# Patient Record
Sex: MALE | Race: WHITE | NOT HISPANIC OR LATINO | Employment: UNEMPLOYED | URBAN - NONMETROPOLITAN AREA
[De-identification: names, ages, dates, MRNs, and addresses within clinical notes are randomized per-mention and may not be internally consistent; named-entity substitution may affect disease eponyms.]

---

## 2017-02-26 ENCOUNTER — HOSPITAL ENCOUNTER (EMERGENCY)
Facility: HOSPITAL | Age: 62
Discharge: HOME | End: 2017-02-26
Admitting: EMERGENCY MEDICINE

## 2017-02-26 ENCOUNTER — APPOINTMENT (OUTPATIENT)
Dept: CT IMAGING | Facility: HOSPITAL | Age: 62
End: 2017-02-26

## 2017-02-26 VITALS
SYSTOLIC BLOOD PRESSURE: 130 MMHG | OXYGEN SATURATION: 100 % | BODY MASS INDEX: 26.67 KG/M2 | RESPIRATION RATE: 18 BRPM | HEART RATE: 55 BPM | TEMPERATURE: 98 F | HEIGHT: 68 IN | DIASTOLIC BLOOD PRESSURE: 74 MMHG | WEIGHT: 176 LBS

## 2017-02-26 DIAGNOSIS — R31.9 BLOOD IN URINE: ICD-10-CM

## 2017-02-26 DIAGNOSIS — R10.9 ABDOMINAL PAIN, UNSPECIFIED LOCATION: ICD-10-CM

## 2017-02-26 DIAGNOSIS — K52.9 GASTROENTERITIS: Primary | ICD-10-CM

## 2017-02-26 LAB
ABO GROUP BLD: NORMAL
ALBUMIN SERPL-MCNC: 4.3 G/DL (ref 3.5–5)
ALBUMIN/GLOB SERPL: 1.3 G/DL (ref 1.1–2.5)
ALP SERPL-CCNC: 65 U/L (ref 24–120)
ALT SERPL W P-5'-P-CCNC: 30 U/L (ref 0–54)
AMYLASE SERPL-CCNC: 59 U/L (ref 30–110)
ANION GAP SERPL CALCULATED.3IONS-SCNC: 10 MMOL/L (ref 4–13)
APTT PPP: 28.8 SECONDS (ref 24.1–34.8)
AST SERPL-CCNC: 28 U/L (ref 7–45)
BACTERIA UR QL AUTO: ABNORMAL /HPF
BASOPHILS # BLD AUTO: 0.01 10*3/MM3 (ref 0–0.2)
BASOPHILS NFR BLD AUTO: 0.1 % (ref 0–2)
BILIRUB SERPL-MCNC: 0.8 MG/DL (ref 0.1–1)
BILIRUB UR QL STRIP: NEGATIVE
BLD GP AB SCN SERPL QL: NEGATIVE
BUN BLD-MCNC: 13 MG/DL (ref 5–21)
BUN/CREAT SERPL: 15.3 (ref 7–25)
CALCIUM SPEC-SCNC: 9.5 MG/DL (ref 8.4–10.4)
CHLORIDE SERPL-SCNC: 101 MMOL/L (ref 98–110)
CLARITY UR: CLEAR
CO2 SERPL-SCNC: 26 MMOL/L (ref 24–31)
COLOR UR: YELLOW
CREAT BLD-MCNC: 0.85 MG/DL (ref 0.5–1.4)
CRP SERPL-MCNC: 1.55 MG/DL (ref 0–0.99)
D-LACTATE SERPL-SCNC: 1.6 MMOL/L (ref 0.5–2)
DEPRECATED RDW RBC AUTO: 36.4 FL (ref 40–54)
EOSINOPHIL # BLD AUTO: 0 10*3/MM3 (ref 0–0.7)
EOSINOPHIL NFR BLD AUTO: 0 % (ref 0–4)
ERYTHROCYTE [DISTWIDTH] IN BLOOD BY AUTOMATED COUNT: 12.3 % (ref 12–15)
GFR SERPL CREATININE-BSD FRML MDRD: 92 ML/MIN/1.73
GLOBULIN UR ELPH-MCNC: 3.4 GM/DL
GLUCOSE BLD-MCNC: 126 MG/DL (ref 70–100)
GLUCOSE UR STRIP-MCNC: NEGATIVE MG/DL
HCT VFR BLD AUTO: 41.6 % (ref 40–52)
HGB BLD-MCNC: 14.7 G/DL (ref 14–18)
HGB UR QL STRIP.AUTO: ABNORMAL
HYALINE CASTS UR QL AUTO: ABNORMAL /LPF
IMM GRANULOCYTES # BLD: 0 10*3/MM3 (ref 0–0.03)
IMM GRANULOCYTES NFR BLD: 0 % (ref 0–5)
INR PPP: 0.98 (ref 0.91–1.09)
KETONES UR QL STRIP: ABNORMAL
LEUKOCYTE ESTERASE UR QL STRIP.AUTO: NEGATIVE
LIPASE SERPL-CCNC: 62 U/L (ref 23–203)
LYMPHOCYTES # BLD AUTO: 0.56 10*3/MM3 (ref 0.72–4.86)
LYMPHOCYTES NFR BLD AUTO: 8.2 % (ref 15–45)
MCH RBC QN AUTO: 28.7 PG (ref 28–32)
MCHC RBC AUTO-ENTMCNC: 35.3 G/DL (ref 33–36)
MCV RBC AUTO: 81.3 FL (ref 82–95)
MONOCYTES # BLD AUTO: 0.15 10*3/MM3 (ref 0.19–1.3)
MONOCYTES NFR BLD AUTO: 2.2 % (ref 4–12)
NEUTROPHILS # BLD AUTO: 6.14 10*3/MM3 (ref 1.87–8.4)
NEUTROPHILS NFR BLD AUTO: 89.5 % (ref 39–78)
NITRITE UR QL STRIP: NEGATIVE
PH UR STRIP.AUTO: 6 [PH] (ref 5–8)
PLATELET # BLD AUTO: 210 10*3/MM3 (ref 130–400)
PMV BLD AUTO: 10.7 FL (ref 6–12)
POTASSIUM BLD-SCNC: 3.9 MMOL/L (ref 3.5–5.3)
PROT SERPL-MCNC: 7.7 G/DL (ref 6.3–8.7)
PROT UR QL STRIP: NEGATIVE
PROTHROMBIN TIME: 13.3 SECONDS (ref 11.9–14.6)
RBC # BLD AUTO: 5.12 10*6/MM3 (ref 4.8–5.9)
RBC # UR: ABNORMAL /HPF
REF LAB TEST METHOD: ABNORMAL
RH BLD: NEGATIVE
SODIUM BLD-SCNC: 137 MMOL/L (ref 135–145)
SP GR UR STRIP: 1.01 (ref 1–1.03)
SQUAMOUS #/AREA URNS HPF: ABNORMAL /HPF
TROPONIN I SERPL-MCNC: 0 NG/ML (ref 0–0.07)
TROPONIN I SERPL-MCNC: 0 NG/ML (ref 0–0.07)
UROBILINOGEN UR QL STRIP: ABNORMAL
WBC NRBC COR # BLD: 6.86 10*3/MM3 (ref 4.8–10.8)
WBC UR QL AUTO: ABNORMAL /HPF

## 2017-02-26 PROCEDURE — 83690 ASSAY OF LIPASE: CPT | Performed by: NURSE PRACTITIONER

## 2017-02-26 PROCEDURE — 86850 RBC ANTIBODY SCREEN: CPT

## 2017-02-26 PROCEDURE — 25010000002 ONDANSETRON PER 1 MG: Performed by: NURSE PRACTITIONER

## 2017-02-26 PROCEDURE — 85025 COMPLETE CBC W/AUTO DIFF WBC: CPT | Performed by: NURSE PRACTITIONER

## 2017-02-26 PROCEDURE — 86900 BLOOD TYPING SEROLOGIC ABO: CPT

## 2017-02-26 PROCEDURE — 86140 C-REACTIVE PROTEIN: CPT | Performed by: NURSE PRACTITIONER

## 2017-02-26 PROCEDURE — 80053 COMPREHEN METABOLIC PANEL: CPT | Performed by: NURSE PRACTITIONER

## 2017-02-26 PROCEDURE — 85730 THROMBOPLASTIN TIME PARTIAL: CPT | Performed by: NURSE PRACTITIONER

## 2017-02-26 PROCEDURE — 83605 ASSAY OF LACTIC ACID: CPT | Performed by: NURSE PRACTITIONER

## 2017-02-26 PROCEDURE — 0 IOPAMIDOL 61 % SOLUTION: Performed by: NURSE PRACTITIONER

## 2017-02-26 PROCEDURE — 93005 ELECTROCARDIOGRAM TRACING: CPT

## 2017-02-26 PROCEDURE — 99284 EMERGENCY DEPT VISIT MOD MDM: CPT

## 2017-02-26 PROCEDURE — 82150 ASSAY OF AMYLASE: CPT | Performed by: NURSE PRACTITIONER

## 2017-02-26 PROCEDURE — 96375 TX/PRO/DX INJ NEW DRUG ADDON: CPT

## 2017-02-26 PROCEDURE — 93005 ELECTROCARDIOGRAM TRACING: CPT | Performed by: NURSE PRACTITIONER

## 2017-02-26 PROCEDURE — 96374 THER/PROPH/DIAG INJ IV PUSH: CPT

## 2017-02-26 PROCEDURE — 84484 ASSAY OF TROPONIN QUANT: CPT

## 2017-02-26 PROCEDURE — 74177 CT ABD & PELVIS W/CONTRAST: CPT

## 2017-02-26 PROCEDURE — 25010000002 HYDROMORPHONE PER 4 MG: Performed by: NURSE PRACTITIONER

## 2017-02-26 PROCEDURE — 85610 PROTHROMBIN TIME: CPT | Performed by: NURSE PRACTITIONER

## 2017-02-26 PROCEDURE — 81001 URINALYSIS AUTO W/SCOPE: CPT | Performed by: NURSE PRACTITIONER

## 2017-02-26 PROCEDURE — 86901 BLOOD TYPING SEROLOGIC RH(D): CPT

## 2017-02-26 PROCEDURE — 93010 ELECTROCARDIOGRAM REPORT: CPT | Performed by: INTERNAL MEDICINE

## 2017-02-26 PROCEDURE — 96361 HYDRATE IV INFUSION ADD-ON: CPT

## 2017-02-26 RX ORDER — ONDANSETRON 4 MG/1
4 TABLET, ORALLY DISINTEGRATING ORAL EVERY 6 HOURS PRN
Qty: 12 TABLET | Refills: 0 | Status: SHIPPED | OUTPATIENT
Start: 2017-02-26 | End: 2017-03-01

## 2017-02-26 RX ORDER — LISINOPRIL 5 MG/1
5 TABLET ORAL DAILY
COMMUNITY
End: 2018-02-23

## 2017-02-26 RX ORDER — ONDANSETRON 2 MG/ML
4 INJECTION INTRAMUSCULAR; INTRAVENOUS ONCE
Status: COMPLETED | OUTPATIENT
Start: 2017-02-26 | End: 2017-02-26

## 2017-02-26 RX ORDER — SODIUM CHLORIDE 0.9 % (FLUSH) 0.9 %
10 SYRINGE (ML) INJECTION AS NEEDED
Status: DISCONTINUED | OUTPATIENT
Start: 2017-02-26 | End: 2017-02-26 | Stop reason: HOSPADM

## 2017-02-26 RX ORDER — FAMOTIDINE 40 MG/1
40 TABLET, FILM COATED ORAL DAILY
Qty: 5 TABLET | Refills: 0 | Status: SHIPPED | OUTPATIENT
Start: 2017-02-26 | End: 2017-03-03

## 2017-02-26 RX ADMIN — ONDANSETRON HYDROCHLORIDE 4 MG: 2 SOLUTION INTRAMUSCULAR; INTRAVENOUS at 17:00

## 2017-02-26 RX ADMIN — HYDROMORPHONE HYDROCHLORIDE 1 MG: 1 INJECTION, SOLUTION INTRAMUSCULAR; INTRAVENOUS; SUBCUTANEOUS at 17:02

## 2017-02-26 RX ADMIN — SODIUM CHLORIDE 1000 ML: 9 INJECTION, SOLUTION INTRAVENOUS at 17:11

## 2017-02-26 RX ADMIN — IOPAMIDOL 100 ML: 612 INJECTION, SOLUTION INTRAVENOUS at 19:02

## 2018-02-23 ENCOUNTER — OFFICE VISIT (OUTPATIENT)
Dept: GASTROENTEROLOGY | Facility: CLINIC | Age: 63
End: 2018-02-23

## 2018-02-23 VITALS
BODY MASS INDEX: 25.24 KG/M2 | HEART RATE: 72 BPM | WEIGHT: 166 LBS | OXYGEN SATURATION: 99 % | DIASTOLIC BLOOD PRESSURE: 80 MMHG | SYSTOLIC BLOOD PRESSURE: 115 MMHG | TEMPERATURE: 98.6 F

## 2018-02-23 DIAGNOSIS — R14.3 FLATULENCE: ICD-10-CM

## 2018-02-23 DIAGNOSIS — K92.1 BLOODY STOOLS: Primary | ICD-10-CM

## 2018-02-23 PROCEDURE — 99204 OFFICE O/P NEW MOD 45 MIN: CPT | Performed by: NURSE PRACTITIONER

## 2018-02-23 RX ORDER — SODIUM, POTASSIUM,MAG SULFATES 17.5-3.13G
2 SOLUTION, RECONSTITUTED, ORAL ORAL ONCE
Qty: 2 BOTTLE | Refills: 0 | Status: SHIPPED | OUTPATIENT
Start: 2018-02-23 | End: 2018-02-23

## 2018-02-23 RX ORDER — MONTELUKAST SODIUM 10 MG/1
TABLET ORAL
Refills: 1 | COMMUNITY
Start: 2018-01-03 | End: 2019-01-23

## 2018-02-23 RX ORDER — LISINOPRIL 5 MG/1
TABLET ORAL
COMMUNITY
Start: 2017-07-03

## 2018-02-23 NOTE — PROGRESS NOTES
Chief Complaint:   Chief Complaint   Patient presents with   • Colonoscopy     Colon Screening/ Blood in stool also         Patient ID: Domingo Evans is a 62 y.o. male     History of Present Illness:This is a very pleasant 62-year-old male who tells me that he was scheduled to have a colonoscopy for screening as he has not ever had this done in the past.  He denies any known history of colon cancer or colon polyps in his family.  The patient states that while he has been waiting to have the colonoscopy, incidentally a week ago he had one incident of blood in his stool.  He states that the blood was bright red noted in the stool as well as in the toilet water.  He states that he had no abdominal pain with this but did have some flatulence.  He states that he did not have an occurrence prior to this or after this.    He denies any nausea, vomiting, epigastric pain, dysphagia, pyrosis or hematemesis.  He denies any fever or chills.  He denies any melena or hematochezia.  He denies any unintentional weight loss or loss of appetite.    No fam hx of  Past Medical History:   Diagnosis Date   • Hypertension        Past Surgical History:   Procedure Laterality Date   • HERNIA REPAIR           Current Outpatient Prescriptions:   •  lisinopril (PRINIVIL,ZESTRIL) 5 MG tablet, 1 tablet by Oral route 1 time per day, Disp: , Rfl:   •  montelukast (SINGULAIR) 10 MG tablet, TK 1 T PO HS, Disp: , Rfl: 1  •  sodium-potassium-magnesium sulfates (SUPREP BOWEL PREP KIT) 17.5-3.13-1.6 GM/180ML solution oral solution, Take 2 bottles by mouth 1 (One) Time for 1 dose. Split dose prep as directed by office instructions provided.  2 bottles = one kit., Disp: 2 bottle, Rfl: 0    Allergies   Allergen Reactions   • Clotrimazole Swelling and Other (See Comments)     Tongue turned green and fury       Social History     Social History   • Marital status:      Spouse name: N/A   • Number of children: N/A   • Years of education: N/A      Occupational History   • Not on file.     Social History Main Topics   • Smoking status: Current Every Day Smoker     Packs/day: 0.50   • Smokeless tobacco: Never Used   • Alcohol use Yes      Comment: Social    • Drug use: Yes     Special: Marijuana      Comment: Daily use   • Sexual activity: Not on file     Other Topics Concern   • Not on file     Social History Narrative       Family History   Problem Relation Age of Onset   • Colon cancer Neg Hx    • Colon polyps Neg Hx        Vitals:    02/23/18 1027   BP: 115/80   Pulse: 72   Temp: 98.6 °F (37 °C)   SpO2: 99%   Weight: 75.3 kg (166 lb)       Review of Systems:    General:    Present -feeling well   Skin:    Not Present-Rash   HEENT:     Not Present-Acute visual changes or Acute hearing changes   Neck :    Not Present- swollen glands   Genitourinary:      Not Present- burning, frequency, urgency hematuria, dysuria,   Cardiovascular:   Not Present-chest pain, palpitations, or pressure   Respiratory:   Not Present- shortness of breath or cough   Gastrointestinal:  Musculoskeletal:  Neurological:  Psychiatric:   Present as mentioned in the HP    Not Present. Recent gait disturbances.    Not Present-Seizures and weakness in extremities.    Not Present- Anxiety or Depression.       Physical Exam:    General Appearance:    Alert, cooperative, in no acute distress   Psych:    Mood appropriate    Eyes:          conjunctivae and sclerae normal, no   icterus, no pallor   ENMT:    Ears appear intact with no abnormalities noted oral mucosa moist   Neck:   No adenopathy, supple, trachea midline, no thyromegaly, no   carotid bruit, no JVD    Cardiovascular:    Regular rhythm and normal rate, normal S1 and S2, no            murmur, no gallop, no rub, no click   Gastrointestinal:     Inspection normal.  Normal bowel sounds, no masses, no organomegaly, soft round non-tender, non-distended, no guarding, no rebound or tenderness. No hepatosplenomegaly.   Skin:   No bleeding,  bruising or rash   Neurologic:   nonfocal       Lab Results   Component Value Date    WBC 6.86 02/26/2017    WBC 6.55 07/25/2016    WBC 6.33 07/31/2015    HGB 14.7 02/26/2017    HGB 13.8 (L) 07/25/2016    HGB 13.7 (L) 07/31/2015    HCT 41.6 02/26/2017    HCT 41.7 07/25/2016    HCT 41.6 07/31/2015     02/26/2017     07/25/2016     07/31/2015        Lab Results   Component Value Date     02/26/2017     07/25/2016     07/31/2015    K 3.9 02/26/2017    K 4.0 07/25/2016    K 3.9 07/31/2015     02/26/2017     07/25/2016     07/31/2015    CO2 26.0 02/26/2017    CO2 29 07/25/2016    CO2 29 07/31/2015    BUN 13 02/26/2017    BUN 15 07/25/2016    BUN 12 07/31/2015    CREATININE 0.85 02/26/2017    CREATININE 0.96 07/25/2016    CREATININE 0.88 07/31/2015    BILITOT 0.8 02/26/2017    BILITOT 1.1 (H) 07/25/2016    BILITOT 0.6 07/31/2015    ALKPHOS 65 02/26/2017    ALKPHOS 62 07/25/2016    ALKPHOS 62 07/31/2015    ALT 30 02/26/2017    ALT 33 07/25/2016    ALT 31 07/31/2015    AST 28 02/26/2017    AST 33 07/25/2016    AST 45 07/31/2015    GLUCOSE 126 (H) 02/26/2017    GLUCOSE 114 (H) 07/25/2016    GLUCOSE 85 07/31/2015       Lab Results   Component Value Date    INR 0.98 02/26/2017       Assessment and Plan:    Domingo was seen today for colonoscopy.    Diagnoses and all orders for this visit:    Bloody stools  -     Case Request; Standing  -     Case Request Colonoscopy    Flatulence  -     Case Request; Standing  -     Case Request    Other orders  -     Implement Anesthesia Orders Day of Procedure; Standing  -     Obtain Informed Consent; Standing  -     Verify bowel prep was successful; Standing  -     sodium-potassium-magnesium sulfates (SUPREP BOWEL PREP KIT) 17.5-3.13-1.6 GM/180ML solution oral solution; Take 2 bottles by mouth 1 (One) Time for 1 dose. Split dose prep as directed by office instructions provided.  2 bottles = one kit.          Next follow-up  appointment      EMR Dragon/Transcription disclaimer:  Much of this encounter note is an electronic transcription/translation of spoken language to printed text. The electronic translation of spoken language may permit erroneous, or at times, nonsensical words or phrases to be inadvertently transcribed; although I have reviewed the note for such errors, some may still exist.

## 2018-04-30 ENCOUNTER — HOSPITAL ENCOUNTER (OUTPATIENT)
Dept: GENERAL RADIOLOGY | Facility: HOSPITAL | Age: 63
Discharge: HOME | End: 2018-04-30
Admitting: NURSE PRACTITIONER

## 2018-04-30 PROCEDURE — 85651 RBC SED RATE NONAUTOMATED: CPT | Performed by: NURSE PRACTITIONER

## 2018-04-30 PROCEDURE — 73110 X-RAY EXAM OF WRIST: CPT

## 2018-04-30 PROCEDURE — 85025 COMPLETE CBC W/AUTO DIFF WBC: CPT | Performed by: NURSE PRACTITIONER

## 2018-04-30 PROCEDURE — 86431 RHEUMATOID FACTOR QUANT: CPT | Performed by: NURSE PRACTITIONER

## 2018-04-30 PROCEDURE — 84550 ASSAY OF BLOOD/URIC ACID: CPT | Performed by: NURSE PRACTITIONER

## 2018-07-16 ENCOUNTER — TELEPHONE (OUTPATIENT)
Dept: GASTROENTEROLOGY | Facility: HOSPITAL | Age: 63
End: 2018-07-16

## 2018-09-11 ENCOUNTER — APPOINTMENT (OUTPATIENT)
Dept: GENERAL RADIOLOGY | Facility: HOSPITAL | Age: 63
End: 2018-09-11

## 2018-09-11 ENCOUNTER — HOSPITAL ENCOUNTER (EMERGENCY)
Facility: HOSPITAL | Age: 63
Discharge: HOME | End: 2018-09-11
Attending: EMERGENCY MEDICINE | Admitting: EMERGENCY MEDICINE

## 2018-09-11 VITALS
OXYGEN SATURATION: 99 % | RESPIRATION RATE: 18 BRPM | WEIGHT: 168 LBS | TEMPERATURE: 96.8 F | BODY MASS INDEX: 26.37 KG/M2 | DIASTOLIC BLOOD PRESSURE: 79 MMHG | HEIGHT: 67 IN | SYSTOLIC BLOOD PRESSURE: 132 MMHG | HEART RATE: 53 BPM

## 2018-09-11 DIAGNOSIS — T14.8XXA MUSCLE STRAIN: Primary | ICD-10-CM

## 2018-09-11 LAB
ANION GAP SERPL CALCULATED.3IONS-SCNC: 11 MMOL/L (ref 4–13)
BASOPHILS # BLD AUTO: 0.04 10*3/MM3 (ref 0–0.2)
BASOPHILS NFR BLD AUTO: 0.5 % (ref 0–2)
BILIRUB UR QL STRIP: NEGATIVE
BUN BLD-MCNC: 15 MG/DL (ref 5–21)
BUN/CREAT SERPL: 17.4 (ref 7–25)
CALCIUM SPEC-SCNC: 9.3 MG/DL (ref 8.4–10.4)
CHLORIDE SERPL-SCNC: 106 MMOL/L (ref 98–110)
CK SERPL-CCNC: 217 U/L (ref 0–203)
CLARITY UR: CLEAR
CO2 SERPL-SCNC: 25 MMOL/L (ref 24–31)
COLOR UR: YELLOW
CREAT BLD-MCNC: 0.86 MG/DL (ref 0.5–1.4)
DEPRECATED RDW RBC AUTO: 37.6 FL (ref 40–54)
EOSINOPHIL # BLD AUTO: 0.28 10*3/MM3 (ref 0–0.7)
EOSINOPHIL NFR BLD AUTO: 3.6 % (ref 0–4)
ERYTHROCYTE [DISTWIDTH] IN BLOOD BY AUTOMATED COUNT: 12.3 % (ref 12–15)
FLUAV AG NPH QL: NEGATIVE
FLUBV AG NPH QL IA: NEGATIVE
GFR SERPL CREATININE-BSD FRML MDRD: 90 ML/MIN/1.73
GLUCOSE BLD-MCNC: 116 MG/DL (ref 70–100)
GLUCOSE UR STRIP-MCNC: NEGATIVE MG/DL
HCT VFR BLD AUTO: 40 % (ref 40–52)
HGB BLD-MCNC: 13.7 G/DL (ref 14–18)
HGB UR QL STRIP.AUTO: NEGATIVE
HOLD SPECIMEN: NORMAL
HOLD SPECIMEN: NORMAL
IMM GRANULOCYTES # BLD: 0.02 10*3/MM3 (ref 0–0.03)
IMM GRANULOCYTES NFR BLD: 0.3 % (ref 0–5)
KETONES UR QL STRIP: NEGATIVE
LEUKOCYTE ESTERASE UR QL STRIP.AUTO: NEGATIVE
LIPASE SERPL-CCNC: 227 U/L (ref 23–203)
LYMPHOCYTES # BLD AUTO: 2.21 10*3/MM3 (ref 0.72–4.86)
LYMPHOCYTES NFR BLD AUTO: 28.4 % (ref 15–45)
MCH RBC QN AUTO: 28.7 PG (ref 28–32)
MCHC RBC AUTO-ENTMCNC: 34.3 G/DL (ref 33–36)
MCV RBC AUTO: 83.7 FL (ref 82–95)
MONOCYTES # BLD AUTO: 0.71 10*3/MM3 (ref 0.19–1.3)
MONOCYTES NFR BLD AUTO: 9.1 % (ref 4–12)
NEUTROPHILS # BLD AUTO: 4.51 10*3/MM3 (ref 1.87–8.4)
NEUTROPHILS NFR BLD AUTO: 58.1 % (ref 39–78)
NITRITE UR QL STRIP: NEGATIVE
NRBC BLD MANUAL-RTO: 0 /100 WBC (ref 0–0)
NT-PROBNP SERPL-MCNC: 36.2 PG/ML (ref 0–900)
PH UR STRIP.AUTO: 6.5 [PH] (ref 5–8)
PLATELET # BLD AUTO: 243 10*3/MM3 (ref 130–400)
PMV BLD AUTO: 10.3 FL (ref 6–12)
POTASSIUM BLD-SCNC: 3.5 MMOL/L (ref 3.5–5.3)
PROCALCITONIN SERPL-MCNC: <0.25 NG/ML
PROT UR QL STRIP: NEGATIVE
RBC # BLD AUTO: 4.78 10*6/MM3 (ref 4.8–5.9)
SODIUM BLD-SCNC: 142 MMOL/L (ref 135–145)
SP GR UR STRIP: 1.01 (ref 1–1.03)
TROPONIN I SERPL-MCNC: <0.012 NG/ML (ref 0–0.03)
UROBILINOGEN UR QL STRIP: NORMAL
WBC NRBC COR # BLD: 7.77 10*3/MM3 (ref 4.8–10.8)
WHOLE BLOOD HOLD SPECIMEN: NORMAL
WHOLE BLOOD HOLD SPECIMEN: NORMAL

## 2018-09-11 PROCEDURE — 93005 ELECTROCARDIOGRAM TRACING: CPT | Performed by: EMERGENCY MEDICINE

## 2018-09-11 PROCEDURE — 84145 PROCALCITONIN (PCT): CPT | Performed by: EMERGENCY MEDICINE

## 2018-09-11 PROCEDURE — 84484 ASSAY OF TROPONIN QUANT: CPT | Performed by: EMERGENCY MEDICINE

## 2018-09-11 PROCEDURE — 87804 INFLUENZA ASSAY W/OPTIC: CPT | Performed by: EMERGENCY MEDICINE

## 2018-09-11 PROCEDURE — 71046 X-RAY EXAM CHEST 2 VIEWS: CPT

## 2018-09-11 PROCEDURE — 93005 ELECTROCARDIOGRAM TRACING: CPT

## 2018-09-11 PROCEDURE — 99284 EMERGENCY DEPT VISIT MOD MDM: CPT

## 2018-09-11 PROCEDURE — 83880 ASSAY OF NATRIURETIC PEPTIDE: CPT | Performed by: EMERGENCY MEDICINE

## 2018-09-11 PROCEDURE — 93010 ELECTROCARDIOGRAM REPORT: CPT | Performed by: INTERNAL MEDICINE

## 2018-09-11 PROCEDURE — 85025 COMPLETE CBC W/AUTO DIFF WBC: CPT | Performed by: EMERGENCY MEDICINE

## 2018-09-11 PROCEDURE — 81003 URINALYSIS AUTO W/O SCOPE: CPT | Performed by: EMERGENCY MEDICINE

## 2018-09-11 PROCEDURE — 82550 ASSAY OF CK (CPK): CPT | Performed by: EMERGENCY MEDICINE

## 2018-09-11 PROCEDURE — 80048 BASIC METABOLIC PNL TOTAL CA: CPT | Performed by: EMERGENCY MEDICINE

## 2018-09-11 PROCEDURE — 73090 X-RAY EXAM OF FOREARM: CPT

## 2018-09-11 PROCEDURE — 83690 ASSAY OF LIPASE: CPT | Performed by: EMERGENCY MEDICINE

## 2018-09-11 PROCEDURE — 36415 COLL VENOUS BLD VENIPUNCTURE: CPT

## 2018-09-11 RX ORDER — PREDNISONE 50 MG/1
50 TABLET ORAL DAILY
Qty: 5 TABLET | Refills: 0 | Status: SHIPPED | OUTPATIENT
Start: 2018-09-11 | End: 2019-01-23

## 2018-09-11 RX ORDER — CYCLOBENZAPRINE HCL 10 MG
10 TABLET ORAL 2 TIMES DAILY PRN
Qty: 15 TABLET | Refills: 0 | Status: SHIPPED | OUTPATIENT
Start: 2018-09-11 | End: 2019-01-23

## 2018-09-11 NOTE — ED PROVIDER NOTES
"Subjective   63 y/o right handed male arrives for evaluation of multiple issues. 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 Patient further notes, for the past two years, multiple intermittent symptoms including \"up and down\" blood pressures (noting them to be normal at night and spiking sometimes to 150s-200s, \"body shaking\" which appears to occur sporadically without warning without associated fevers or LOC for which he has see his PMD and has been told \"there is nothing wrong with me.\" He does endorse that he takes a \"very low dose of blood pressure medication\" as apparently 20 mg of lisinopril \"caused me to go nuts.\" He has yet to see his PMD this year about his symptom as \"well I had normal work up before.\" He denies cp, sob, flank or back pain, abdominal pain, fevers, chills, dysuria, hematuria, rash, nausea, vomiting, diarrhea, falls, trauma or other issues. He arrives in [facility].      Family, social and past history reviewed as below, prior documentation of H and Ps and other documentation are reviewed:    Past Medical History:  No date: Hypertension    Past Surgical History:  No date: HERNIA REPAIR    Social History    Marital status:             Spouse name:                       Years of education:                 Number of children:               Occupational History    None on file    Social History Main Topics    Smoking status: Current Every Day Smoker                                                     Packs/day: 1.00      Years: 0.00        Smokeless tobacco: Never Used                        Alcohol use: Yes                Comment: Social     Drug use: Yes                Types: Marijuana       Comment: Daily use    " Sexual activity: Not on file          Other Topics            Concern    None on file    Social History Narrative    None on file        Family history: reviewed and noncontributory             Review of Systems   All other systems reviewed and are negative.      Past Medical History:   Diagnosis Date   • Hypertension        Allergies   Allergen Reactions   • Clotrimazole Swelling and Other (See Comments)     Tongue turned green and fury       Past Surgical History:   Procedure Laterality Date   • HERNIA REPAIR         Family History   Problem Relation Age of Onset   • Colon cancer Neg Hx    • Colon polyps Neg Hx        Social History     Social History   • Marital status:      Social History Main Topics   • Smoking status: Current Every Day Smoker     Packs/day: 1.00   • Smokeless tobacco: Never Used   • Alcohol use Yes      Comment: Social    • Drug use: Yes     Types: Marijuana      Comment: Daily use     Other Topics Concern   • Not on file           Objective   Physical Exam   Constitutional: He is oriented to person, place, and time. He appears well-developed and well-nourished.   HENT:   Head: Normocephalic.   Nose: Nose normal.   Eyes: Pupils are equal, round, and reactive to light. Conjunctivae and EOM are normal.   Neck: Normal range of motion. Neck supple.   Cardiovascular: Normal rate, regular rhythm, normal heart sounds and intact distal pulses.    Pulmonary/Chest: Effort normal and breath sounds normal.   Abdominal: Soft. Bowel sounds are normal.   Musculoskeletal: Normal range of motion.   Tenderness over the lateral lower forearm over the tendon insertion points, pain worse with flexion and extension of the arm, no deformities, no step offs, no weakness, no atropy,     Radial and unlar are 2/4 bilaterally, sensation is intact    Cap refill less than two seconds    No pain at the elbow, no effusions, no signs of infection.     strength of extremity is 5/5   Neurological: He is alert and  oriented to person, place, and time. He displays normal reflexes. No cranial nerve deficit or sensory deficit. He exhibits normal muscle tone. Coordination normal.   Skin: Skin is warm. Capillary refill takes less than 2 seconds.   Psychiatric: He has a normal mood and affect. His behavior is normal.   Vitals reviewed.      ECG 12 Lead    Date/Time: 9/11/2018 2:12 AM  Performed by: PHONG FINLEY  Authorized by: PHONG FINLEY   Interpreted by physician  Rhythm: sinus bradycardia  Rate: bradycardic  BPM: 53  QRS axis: normal                   ED Course      Patient relates multiple symptoms present intermittently for more than 2 years. The shaking is without LOC or tongue biting and last seconds. This does not sound like a seizure. His blood work was unrevealing for any pathology to explain his symptoms. As for his arm, this appears to be muscle strain vs tendonitis. Will place in Velcro splint and provide with muscle relaxer's and steroids. I have encouraged he follow back up with Dr. Mota for further evaluation. He is cooperative with plan for discharge. He is aware of all findings. He is seen ambulatory in the ED multiple times without issue.             MDM      Final diagnoses:   Muscle strain            Phong Finley MD  09/11/18 0334       Phong Finley MD  09/11/18 [id number]

## 2018-10-27 ENCOUNTER — HOSPITAL ENCOUNTER (EMERGENCY)
Age: 63
Discharge: HOME | End: 2018-10-28
Attending: EMERGENCY MEDICINE
Payer: MEDICAID

## 2018-10-27 DIAGNOSIS — F41.1 ANXIETY STATE: ICD-10-CM

## 2018-10-27 DIAGNOSIS — K59.00 CONSTIPATION, UNSPECIFIED CONSTIPATION TYPE: ICD-10-CM

## 2018-10-27 DIAGNOSIS — R10.84 GENERALIZED ABDOMINAL PAIN: Primary | ICD-10-CM

## 2018-10-27 PROCEDURE — 93005 ELECTROCARDIOGRAM TRACING: CPT

## 2018-10-27 PROCEDURE — 99284 EMERGENCY DEPT VISIT MOD MDM: CPT

## 2018-10-27 RX ORDER — LISINOPRIL 5 MG/1
5 TABLET ORAL DAILY
COMMUNITY

## 2018-10-27 ASSESSMENT — PAIN DESCRIPTION - LOCATION: LOCATION: ABDOMEN

## 2018-10-27 ASSESSMENT — PAIN SCALES - GENERAL: PAINLEVEL_OUTOF10: 5

## 2018-10-28 ENCOUNTER — APPOINTMENT (OUTPATIENT)
Dept: CT IMAGING | Age: 63
End: 2018-10-28
Payer: MEDICAID

## 2018-10-28 VITALS
TEMPERATURE: 98 F | BODY MASS INDEX: 25.11 KG/M2 | WEIGHT: 160 LBS | SYSTOLIC BLOOD PRESSURE: 122 MMHG | DIASTOLIC BLOOD PRESSURE: 78 MMHG | RESPIRATION RATE: 20 BRPM | HEIGHT: 67 IN | HEART RATE: 78 BPM | OXYGEN SATURATION: 98 %

## 2018-10-28 LAB
ALBUMIN SERPL-MCNC: 4.5 G/DL (ref 3.5–5.2)
ALP BLD-CCNC: 73 U/L (ref 40–130)
ALT SERPL-CCNC: 17 U/L (ref 5–41)
ANION GAP SERPL CALCULATED.3IONS-SCNC: 12 MMOL/L (ref 7–19)
AST SERPL-CCNC: 23 U/L (ref 5–40)
BASOPHILS ABSOLUTE: 0 K/UL (ref 0–0.2)
BASOPHILS RELATIVE PERCENT: 0.6 % (ref 0–1)
BILIRUB SERPL-MCNC: 0.6 MG/DL (ref 0.2–1.2)
BILIRUBIN URINE: NEGATIVE
BLOOD, URINE: NEGATIVE
BUN BLDV-MCNC: 19 MG/DL (ref 8–23)
CALCIUM SERPL-MCNC: 9.6 MG/DL (ref 8.8–10.2)
CHLORIDE BLD-SCNC: 101 MMOL/L (ref 98–111)
CLARITY: CLEAR
CO2: 26 MMOL/L (ref 22–29)
COLOR: YELLOW
CREAT SERPL-MCNC: 0.9 MG/DL (ref 0.5–1.2)
EOSINOPHILS ABSOLUTE: 0.3 K/UL (ref 0–0.6)
EOSINOPHILS RELATIVE PERCENT: 3.8 % (ref 0–5)
GFR NON-AFRICAN AMERICAN: >60
GLUCOSE BLD-MCNC: 140 MG/DL (ref 74–109)
GLUCOSE URINE: NEGATIVE MG/DL
HCT VFR BLD CALC: 42.4 % (ref 42–52)
HEMOGLOBIN: 13.7 G/DL (ref 14–18)
KETONES, URINE: NEGATIVE MG/DL
LACTIC ACID: 1.2 MMOL/L (ref 0.5–1.9)
LEUKOCYTE ESTERASE, URINE: NEGATIVE
LIPASE: 43 U/L (ref 13–60)
LYMPHOCYTES ABSOLUTE: 2.3 K/UL (ref 1.1–4.5)
LYMPHOCYTES RELATIVE PERCENT: 34.4 % (ref 20–40)
MCH RBC QN AUTO: 28.6 PG (ref 27–31)
MCHC RBC AUTO-ENTMCNC: 32.3 G/DL (ref 33–37)
MCV RBC AUTO: 88.5 FL (ref 80–94)
MONOCYTES ABSOLUTE: 0.6 K/UL (ref 0–0.9)
MONOCYTES RELATIVE PERCENT: 8.4 % (ref 0–10)
NEUTROPHILS ABSOLUTE: 3.5 K/UL (ref 1.5–7.5)
NEUTROPHILS RELATIVE PERCENT: 52.6 % (ref 50–65)
NITRITE, URINE: NEGATIVE
PDW BLD-RTO: 12.4 % (ref 11.5–14.5)
PH UA: 6
PLATELET # BLD: 227 K/UL (ref 130–400)
PMV BLD AUTO: 9.9 FL (ref 9.4–12.4)
POTASSIUM SERPL-SCNC: 3.8 MMOL/L (ref 3.5–5)
PROTEIN UA: NEGATIVE MG/DL
RBC # BLD: 4.79 M/UL (ref 4.7–6.1)
SODIUM BLD-SCNC: 139 MMOL/L (ref 136–145)
SPECIFIC GRAVITY UA: 1.01
TOTAL PROTEIN: 7.5 G/DL (ref 6.6–8.7)
TROPONIN: <0.01 NG/ML (ref 0–0.03)
URINE REFLEX TO CULTURE: NORMAL
UROBILINOGEN, URINE: 0.2 E.U./DL
WBC # BLD: 6.6 K/UL (ref 4.8–10.8)

## 2018-10-28 PROCEDURE — 81003 URINALYSIS AUTO W/O SCOPE: CPT

## 2018-10-28 PROCEDURE — 84484 ASSAY OF TROPONIN QUANT: CPT

## 2018-10-28 PROCEDURE — 83605 ASSAY OF LACTIC ACID: CPT

## 2018-10-28 PROCEDURE — 99284 EMERGENCY DEPT VISIT MOD MDM: CPT | Performed by: EMERGENCY MEDICINE

## 2018-10-28 PROCEDURE — 85025 COMPLETE CBC W/AUTO DIFF WBC: CPT

## 2018-10-28 PROCEDURE — 36415 COLL VENOUS BLD VENIPUNCTURE: CPT

## 2018-10-28 PROCEDURE — 74177 CT ABD & PELVIS W/CONTRAST: CPT

## 2018-10-28 PROCEDURE — 6360000004 HC RX CONTRAST MEDICATION: Performed by: EMERGENCY MEDICINE

## 2018-10-28 PROCEDURE — 80053 COMPREHEN METABOLIC PANEL: CPT

## 2018-10-28 PROCEDURE — 83690 ASSAY OF LIPASE: CPT

## 2018-10-28 RX ORDER — POLYETHYLENE GLYCOL 3350 17 G/17G
17 POWDER, FOR SOLUTION ORAL DAILY PRN
Qty: 510 G | Refills: 0 | Status: SHIPPED | OUTPATIENT
Start: 2018-10-28 | End: 2018-11-27

## 2018-10-28 RX ADMIN — IOPAMIDOL 90 ML: 755 INJECTION, SOLUTION INTRAVENOUS at 00:34

## 2018-10-28 ASSESSMENT — ENCOUNTER SYMPTOMS
VOMITING: 0
ABDOMINAL PAIN: 1
EYE PAIN: 0
SHORTNESS OF BREATH: 0
CONSTIPATION: 1
DIARRHEA: 0

## 2018-10-28 ASSESSMENT — PAIN SCALES - GENERAL: PAINLEVEL_OUTOF10: 0

## 2018-10-29 LAB
EKG P AXIS: 48 DEGREES
EKG P-R INTERVAL: 174 MS
EKG Q-T INTERVAL: 412 MS
EKG QRS DURATION: 98 MS
EKG QTC CALCULATION (BAZETT): 412 MS
EKG T AXIS: 34 DEGREES

## 2018-12-19 ENCOUNTER — HOSPITAL ENCOUNTER (EMERGENCY)
Age: 63
Discharge: HOME | End: 2018-12-20
Attending: EMERGENCY MEDICINE
Payer: MEDICAID

## 2018-12-19 DIAGNOSIS — R51.9 ACUTE NONINTRACTABLE HEADACHE, UNSPECIFIED HEADACHE TYPE: Primary | ICD-10-CM

## 2018-12-19 LAB
ALBUMIN SERPL-MCNC: 4.2 G/DL (ref 3.5–5.2)
ALP BLD-CCNC: 68 U/L (ref 40–130)
ALT SERPL-CCNC: 16 U/L (ref 5–41)
ANION GAP SERPL CALCULATED.3IONS-SCNC: 10 MMOL/L (ref 7–19)
AST SERPL-CCNC: 21 U/L (ref 5–40)
BASOPHILS ABSOLUTE: 0.1 K/UL (ref 0–0.2)
BASOPHILS RELATIVE PERCENT: 0.6 % (ref 0–1)
BILIRUB SERPL-MCNC: 0.4 MG/DL (ref 0.2–1.2)
BILIRUBIN URINE: NEGATIVE
BLOOD, URINE: NEGATIVE
BUN BLDV-MCNC: 16 MG/DL (ref 8–23)
CALCIUM SERPL-MCNC: 9.4 MG/DL (ref 8.8–10.2)
CHLORIDE BLD-SCNC: 103 MMOL/L (ref 98–111)
CLARITY: CLEAR
CO2: 27 MMOL/L (ref 22–29)
COLOR: YELLOW
CREAT SERPL-MCNC: 0.9 MG/DL (ref 0.5–1.2)
EOSINOPHILS ABSOLUTE: 0.2 K/UL (ref 0–0.6)
EOSINOPHILS RELATIVE PERCENT: 2.7 % (ref 0–5)
GFR NON-AFRICAN AMERICAN: >60
GLUCOSE BLD-MCNC: 118 MG/DL (ref 74–109)
GLUCOSE URINE: NEGATIVE MG/DL
HCT VFR BLD CALC: 41.3 % (ref 42–52)
HEMOGLOBIN: 13.5 G/DL (ref 14–18)
KETONES, URINE: NEGATIVE MG/DL
LEUKOCYTE ESTERASE, URINE: NEGATIVE
LYMPHOCYTES ABSOLUTE: 1.4 K/UL (ref 1.1–4.5)
LYMPHOCYTES RELATIVE PERCENT: 17.7 % (ref 20–40)
MCH RBC QN AUTO: 28.5 PG (ref 27–31)
MCHC RBC AUTO-ENTMCNC: 32.7 G/DL (ref 33–37)
MCV RBC AUTO: 87.1 FL (ref 80–94)
MONOCYTES ABSOLUTE: 0.6 K/UL (ref 0–0.9)
MONOCYTES RELATIVE PERCENT: 7.2 % (ref 0–10)
NEUTROPHILS ABSOLUTE: 5.5 K/UL (ref 1.5–7.5)
NEUTROPHILS RELATIVE PERCENT: 70.9 % (ref 50–65)
NITRITE, URINE: NEGATIVE
PDW BLD-RTO: 12.3 % (ref 11.5–14.5)
PH UA: 6.5
PLATELET # BLD: 210 K/UL (ref 130–400)
PMV BLD AUTO: 10.2 FL (ref 9.4–12.4)
POTASSIUM SERPL-SCNC: 4.3 MMOL/L (ref 3.5–5)
PROTEIN UA: NEGATIVE MG/DL
RAPID INFLUENZA  B AGN: NEGATIVE
RAPID INFLUENZA A AGN: NEGATIVE
RBC # BLD: 4.74 M/UL (ref 4.7–6.1)
SODIUM BLD-SCNC: 140 MMOL/L (ref 136–145)
SPECIFIC GRAVITY UA: 1.01
TOTAL PROTEIN: 7.4 G/DL (ref 6.6–8.7)
URINE REFLEX TO CULTURE: NORMAL
UROBILINOGEN, URINE: 0.2 E.U./DL
WBC # BLD: 7.8 K/UL (ref 4.8–10.8)

## 2018-12-19 PROCEDURE — 85025 COMPLETE CBC W/AUTO DIFF WBC: CPT

## 2018-12-19 PROCEDURE — 36415 COLL VENOUS BLD VENIPUNCTURE: CPT

## 2018-12-19 PROCEDURE — 99284 EMERGENCY DEPT VISIT MOD MDM: CPT

## 2018-12-19 PROCEDURE — 80053 COMPREHEN METABOLIC PANEL: CPT

## 2018-12-19 ASSESSMENT — PAIN DESCRIPTION - ORIENTATION: ORIENTATION: LEFT;LOWER

## 2018-12-19 ASSESSMENT — PAIN DESCRIPTION - LOCATION: LOCATION: ABDOMEN;HEAD

## 2018-12-20 ENCOUNTER — OFFICE VISIT (OUTPATIENT)
Dept: GASTROENTEROLOGY | Age: 63
End: 2018-12-20
Payer: MEDICAID

## 2018-12-20 VITALS
BODY MASS INDEX: 26.37 KG/M2 | SYSTOLIC BLOOD PRESSURE: 132 MMHG | DIASTOLIC BLOOD PRESSURE: 78 MMHG | TEMPERATURE: 98 F | WEIGHT: 168 LBS | OXYGEN SATURATION: 98 % | HEIGHT: 67 IN | HEART RATE: 78 BPM | RESPIRATION RATE: 20 BRPM

## 2018-12-20 VITALS
BODY MASS INDEX: 25.88 KG/M2 | HEIGHT: 66 IN | WEIGHT: 161 LBS | HEART RATE: 78 BPM | SYSTOLIC BLOOD PRESSURE: 130 MMHG | OXYGEN SATURATION: 98 % | DIASTOLIC BLOOD PRESSURE: 77 MMHG

## 2018-12-20 DIAGNOSIS — R10.32 LLQ ABDOMINAL PAIN: ICD-10-CM

## 2018-12-20 DIAGNOSIS — K59.09 CHRONIC CONSTIPATION: Primary | ICD-10-CM

## 2018-12-20 DIAGNOSIS — R10.13 EPIGASTRIC BURNING SENSATION: ICD-10-CM

## 2018-12-20 DIAGNOSIS — R12 CHRONIC HEARTBURN: ICD-10-CM

## 2018-12-20 PROCEDURE — 3017F COLORECTAL CA SCREEN DOC REV: CPT | Performed by: NURSE PRACTITIONER

## 2018-12-20 PROCEDURE — G8484 FLU IMMUNIZE NO ADMIN: HCPCS | Performed by: NURSE PRACTITIONER

## 2018-12-20 PROCEDURE — 99203 OFFICE O/P NEW LOW 30 MIN: CPT | Performed by: NURSE PRACTITIONER

## 2018-12-20 PROCEDURE — 87804 INFLUENZA ASSAY W/OPTIC: CPT

## 2018-12-20 PROCEDURE — 81003 URINALYSIS AUTO W/O SCOPE: CPT

## 2018-12-20 PROCEDURE — G8419 CALC BMI OUT NRM PARAM NOF/U: HCPCS | Performed by: NURSE PRACTITIONER

## 2018-12-20 PROCEDURE — 4004F PT TOBACCO SCREEN RCVD TLK: CPT | Performed by: NURSE PRACTITIONER

## 2018-12-20 PROCEDURE — 96375 TX/PRO/DX INJ NEW DRUG ADDON: CPT

## 2018-12-20 PROCEDURE — 6360000002 HC RX W HCPCS: Performed by: EMERGENCY MEDICINE

## 2018-12-20 PROCEDURE — 99283 EMERGENCY DEPT VISIT LOW MDM: CPT | Performed by: EMERGENCY MEDICINE

## 2018-12-20 PROCEDURE — G8427 DOCREV CUR MEDS BY ELIG CLIN: HCPCS | Performed by: NURSE PRACTITIONER

## 2018-12-20 PROCEDURE — 96374 THER/PROPH/DIAG INJ IV PUSH: CPT

## 2018-12-20 RX ORDER — DOCUSATE SODIUM 100 MG/1
100 CAPSULE, LIQUID FILLED ORAL 2 TIMES DAILY
Qty: 60 CAPSULE | Refills: 11 | Status: SHIPPED | OUTPATIENT
Start: 2018-12-20

## 2018-12-20 RX ORDER — KETOROLAC TROMETHAMINE 30 MG/ML
30 INJECTION, SOLUTION INTRAMUSCULAR; INTRAVENOUS ONCE
Status: COMPLETED | OUTPATIENT
Start: 2018-12-20 | End: 2018-12-20

## 2018-12-20 RX ORDER — DEXAMETHASONE SODIUM PHOSPHATE 10 MG/ML
10 INJECTION, SOLUTION INTRAMUSCULAR; INTRAVENOUS ONCE
Status: COMPLETED | OUTPATIENT
Start: 2018-12-20 | End: 2018-12-20

## 2018-12-20 RX ORDER — DIPHENHYDRAMINE HYDROCHLORIDE 50 MG/ML
50 INJECTION INTRAMUSCULAR; INTRAVENOUS ONCE
Status: COMPLETED | OUTPATIENT
Start: 2018-12-20 | End: 2018-12-20

## 2018-12-20 RX ORDER — METOCLOPRAMIDE HYDROCHLORIDE 5 MG/ML
10 INJECTION INTRAMUSCULAR; INTRAVENOUS ONCE
Status: COMPLETED | OUTPATIENT
Start: 2018-12-20 | End: 2018-12-20

## 2018-12-20 RX ORDER — POLYETHYLENE GLYCOL 3350 17 G/17G
POWDER, FOR SOLUTION ORAL
COMMUNITY
Start: 2018-12-19

## 2018-12-20 RX ADMIN — DIPHENHYDRAMINE HYDROCHLORIDE 50 MG: 50 INJECTION, SOLUTION INTRAMUSCULAR; INTRAVENOUS at 00:05

## 2018-12-20 RX ADMIN — METOCLOPRAMIDE 10 MG: 5 INJECTION, SOLUTION INTRAMUSCULAR; INTRAVENOUS at 00:05

## 2018-12-20 RX ADMIN — DEXAMETHASONE SODIUM PHOSPHATE 10 MG: 10 INJECTION, SOLUTION INTRAMUSCULAR; INTRAVENOUS at 00:05

## 2018-12-20 RX ADMIN — KETOROLAC TROMETHAMINE 30 MG: 30 INJECTION, SOLUTION INTRAMUSCULAR at 00:05

## 2018-12-20 ASSESSMENT — ENCOUNTER SYMPTOMS
RECTAL PAIN: 0
SORE THROAT: 0
NAUSEA: 0
ABDOMINAL DISTENTION: 0
BACK PAIN: 0
CONSTIPATION: 0
CHEST TIGHTNESS: 0
COUGH: 0
ABDOMINAL PAIN: 1
VOMITING: 0
SHORTNESS OF BREATH: 0
TROUBLE SWALLOWING: 0
DIARRHEA: 0
NAUSEA: 1
COLOR CHANGE: 0
PHOTOPHOBIA: 0
WHEEZING: 0
VOICE CHANGE: 0
BLOOD IN STOOL: 0
ANAL BLEEDING: 0
VOMITING: 0
DIARRHEA: 0
TROUBLE SWALLOWING: 0
CONSTIPATION: 1
BACK PAIN: 0
SINUS PRESSURE: 0
ABDOMINAL PAIN: 0
SHORTNESS OF BREATH: 0
EYE PAIN: 0

## 2018-12-20 ASSESSMENT — PAIN SCALES - GENERAL: PAINLEVEL_OUTOF10: 7

## 2018-12-20 NOTE — ED PROVIDER NOTES
BP: (!) 173/96 (!) 144/87 137/84 132/78   Pulse:  67 71 78   Resp:  17 17 20   Temp:    98 °F (36.7 °C)   TempSrc:       SpO2:  92% 96% 98%   Weight:       Height:           MDM  Number of Diagnoses or Management Options  Acute nonintractable headache, unspecified headache type: new and requires workup  Diagnosis management comments: Patient complains of tenderness to palpation over the left TMJ. I suspect this is the source of his pain. He tells me that when he tries to yawn the joint space will hurt. Patient was given anti-inflammatory nausea medicine here in the ED. He feels significantly better and would like to go home. I do not believe his pain is of a cardiac origin. I suggested that he follow-up with a dentist.  After careful review of history, physical, and supporting data, there is very low suspicion for a life or limb threatening cause of the patients jaw pain. The patient's symptoms have improved from presentation and appears clinically well. Patient reexamined prior to discharge and appears improved. Patient has been encouraged to follow up with his/her PCP or dentitst and to return to the ED with any lack of improvement or worsening symptoms. The patient';s questions regarding the work up and plan were invited and answered. The patient/guardian states understanding and agreement with the plan. Patient Progress  Patient progress: stable      Reassessment      CONSULTS:  None    PROCEDURES:  Unless otherwise noted below, none     Procedures    FINAL IMPRESSION      1.  Acute nonintractable headache, unspecified headache type          DISPOSITION/PLAN   DISPOSITION Decision To Discharge 12/20/2018 12:33:30 AM      PATIENT REFERRED TO:  Froylan Goff MD  [facility] [phone]    Call in 3 days  For follow up, As needed      DISCHARGE MEDICATIONS:  Discharge Medication List as of 12/20/2018 12:33 AM             (Please note that portions of this note were completed with a

## 2018-12-20 NOTE — PATIENT INSTRUCTIONS
the prescribing physician will be obtained before your procedure is scheduled. Increased [email or web address] may be associated with discontinuation of your blood thinner and include, but not limited to, stroke, TIA, or cardiac event. If biopsies are taken during the procedure they will be sent to a pathologist for analysis. You will be notified by mail of the pathology results in 2-3 weeks. Your physician may also schedule a follow up appointment with the nurse practitioner to discuss pathology, symptoms or to check if you have had any problems related to your procedure. If you prefer not to return to the office after your procedure please discuss this with your physician on the day of your procedure.

## 2018-12-20 NOTE — ED NOTES
Pt on the phone with his ride, she states she will come to get the pt.      Mannie Grubbs RN  12/20/18 0727

## 2019-08-08 ENCOUNTER — TELEPHONE (OUTPATIENT)
Dept: OTOLARYNGOLOGY | Facility: CLINIC | Age: 64
End: 2019-08-08

## 2019-09-09 ENCOUNTER — OFFICE VISIT (OUTPATIENT)
Dept: OTOLARYNGOLOGY | Facility: CLINIC | Age: 64
End: 2019-09-09

## 2019-09-09 VITALS
HEART RATE: 78 BPM | SYSTOLIC BLOOD PRESSURE: 141 MMHG | DIASTOLIC BLOOD PRESSURE: 80 MMHG | TEMPERATURE: 97.8 F | BODY MASS INDEX: 25.71 KG/M2 | HEIGHT: 67 IN | WEIGHT: 163.8 LBS

## 2019-09-09 DIAGNOSIS — J30.9 ALLERGIC RHINITIS, UNSPECIFIED SEASONALITY, UNSPECIFIED TRIGGER: ICD-10-CM

## 2019-09-09 DIAGNOSIS — H65.33 CHRONIC MUCOID OTITIS MEDIA OF BOTH EARS: Primary | ICD-10-CM

## 2019-09-09 DIAGNOSIS — M26.623 BILATERAL TEMPOROMANDIBULAR JOINT PAIN: ICD-10-CM

## 2019-09-09 DIAGNOSIS — J32.9 CHRONIC SINUSITIS, UNSPECIFIED LOCATION: ICD-10-CM

## 2019-09-09 DIAGNOSIS — H93.13 TINNITUS OF BOTH EARS: ICD-10-CM

## 2019-09-09 DIAGNOSIS — H69.83 DYSFUNCTION OF BOTH EUSTACHIAN TUBES: ICD-10-CM

## 2019-09-09 DIAGNOSIS — H91.93 BILATERAL HEARING LOSS, UNSPECIFIED HEARING LOSS TYPE: ICD-10-CM

## 2019-09-09 PROBLEM — H69.93 DYSFUNCTION OF BOTH EUSTACHIAN TUBES: Status: ACTIVE | Noted: 2019-09-09

## 2019-09-09 PROCEDURE — 99214 OFFICE O/P EST MOD 30 MIN: CPT | Performed by: PHYSICIAN ASSISTANT

## 2019-09-09 RX ORDER — AZELASTINE 1 MG/ML
2 SPRAY, METERED NASAL 2 TIMES DAILY
Qty: 30 ML | Refills: 11 | Status: SHIPPED | OUTPATIENT
Start: 2019-09-09 | End: 2022-01-27

## 2019-09-09 NOTE — PATIENT INSTRUCTIONS
Follow-up in 6 weeks to recheck ears with audiogram.    TemporoMandibular Joint Syndrome      TMJ is generally caused by the clenching or grinding of the teeth at night, but can happen during the day, also.    Symptoms of TMJ:    Pain in or around ear, jaw or neck area  Tenderness around ear or jaw  Popping/clicking from joint in jaw  Tension-type headaches  Stiffness/tightening of joint in jaw    Here are some things to avoid that can cause aggravation of your TMJ:    Do not chew gum.  Do not chew on hard candy.  Avoid eating hard to chew foods.  Decrease stress.    You will need to go on a soft food diet for two (2) weeks, especially when you are symptomatic of TMJ.    If taking any medicines for your TMJ, it is best to take those at night to decrease side effects of the medicine.    You will need to purchase a bite block or guard.  You can either purchase a custom fit guard from your dentist or you can purchase one from a local store in the sports section.    TINNITUS MASKERS  (White Noise Signal Generators)    What is Tinnitus?  Tinnitus is a condition where noises in the ear or the head may be heard but which have no external cause.  The intensity and type of noise varies from each sufferer, but the most common reports are of ringing noises and high-pitched pure tones.  These noises may be heard all day or for short spells throughout the day.  This may cause lack of concentration and sleep and in some cases may cause severe discomfort.    What is a Tinnitus Masker?  A tinnitus masker is an electronic device which generates a band of noise of sufficient frequency and intensity to “Mask” the tinnitus so as to distract the sufferers mind from the noises, allowing them to carry on with their normal routines.    Facts about Tinnitus  The word “tinnitus” derives from the Latin works “tinnire” and means to ring or tinkle.  The noise heard by suffers is often said to sound like ringing, roaring, hissing or clicking in  the ear.  This noise is heard only by the person suffering from the condition and has no external cause.  Almost everybody may experience noises in the ears or head if quiet ambient conditions prevail, but these are usually of low intensity and short duration.  When the intensity and duration increase to the point where the noises are heard during normal everyday conditions, the sufferer may experience discomfort and stress.  This is known as Tinnitus.    What causes Tinnitus?  Scientific and medical knowledge about tinnitus is limited, but research has shown the tinnitus may be caused by exposure to loud noises, disease or a hearing loss. But the majority of cases appear to involve the inner ear, the neural pathways between the ear and the brain itself.  Diet can also be a contributing factor, as some foods are generally identified as causing or aggravating tinnitus.     Alcohol excess alcohol can induce tinnitus in almost everyone.   Salt  Reducing salt intake may help reduce body fluids and help tinnitus.   Caffeine May stimulate the nervous system  Cheese (and other dairy products) are the most commonly identified foods causing or aggravating tinnitus.    Cure or Remedy  A cure for tinnitus has not presently been identified as there is insufficient knowledge on the subject.  However, it has been established that a successful method of relieving the symptoms of tinnitus is to mask the noises using a device know as a Tinnitus Masker.  These devices generate electronically a band of noise of sufficient frequency and intensity to “block out” or mask the noises within the ear/head.  For users of hearing aids, the hearing aid on its own may provide sufficient sound to relieve the tinnitus.                               Warning  Tinnitus maskers should not be worn too loud or for long periods continuously as this can damage your hearing.    The Puretone Range of Tinnitus Maskers    Custom Made Devices  These Tinnitus  maskers are manufactured from an impression of the ear so it fits snugly, matching your individual ear contours.  They may be made in a variety of sizes to fit in the ear or in the ear canal.  Large vents are generally fitted so as not to occlude the ear.  Each masker will have a battery compartment and a volume control (with ON/OFF switch) allowing you to select a comfortable but effective level of loudness.    Behind the Ear Devices  A range of tinnitus maskers incorporated into hearing aid cases which fit neatly behind the ear.  Three models are available of different sizes each of which can be fitted with one of three frequency weighted circuits.  Each masker will be fitted with a battery compartment, an ON/Off switch and a used operated tone switch.    Combination Devices  Either of the above devices may be made as a combination hearing aid/tinnitus masker.  This allows it to be used as a hearing aid for normal use and then the masker noise may be mixed with the hearing aid when required.  These are designed for hearing impaired people who suffer from tinnitus.    Standard Canal Devices  Two models make up this range of generic tinnitus maskers which are small enough to fit within the ear canal.  The first model is fitted with a bullet shaped shell and the second is fitted with a contoured shell.  This allows for greater flexibility within a small generic device.  Each model is fitted with a battery compartment and a volume control which incorporates the ON/Off switch.    Desktop/Pillow Devices  These devices can be placed on the desk at home or in the office and used at a distance away from the user.  The pillow masker is fitted with a timer which may be set so that it will switch off the device when the user is asleep.  A slimline external transducer is placed under the pillow, allowing the masking noise to filter through the pillow so as to aid sleep.    For more information:  Quit Now  [place]  1-800-QUIT-NOW  https://[place].quitlogix.org/en-[place]/

## 2019-09-19 NOTE — PROGRESS NOTES
REYNA Cooney     Chief Complaint   Patient presents with   • Sinus Problem     ears,        HISTORY OF PRESENT ILLNESS:     Domingo Evans is a  63 y.o.  male who complains of ear fullness, ear pressure, fluid on the ear, hearing loss and tinnitus, nasal congestion, postnasal drip and allergy. The symptoms are localized to the bilateral ears and nose. The patient has had moderate symptoms. The symptoms have been relatively constant for the last several months. The symptoms are aggravated by  no identifiable factors. The symptoms are improved by no identifiable factors.    Review of Systems   Constitutional: Negative for activity change, appetite change, chills, diaphoresis, fatigue, fever and unexpected weight change.   HENT: Positive for congestion, ear pain, hearing loss, postnasal drip and tinnitus. Negative for ear discharge, facial swelling, mouth sores, nosebleeds, rhinorrhea, sinus pressure, sneezing, sore throat, trouble swallowing and voice change.    Eyes: Negative for pain, discharge, redness, itching and visual disturbance.   Respiratory: Negative for apnea, cough, choking, chest tightness, shortness of breath, wheezing and stridor.    Gastrointestinal: Negative for nausea and vomiting.   Endocrine: Negative for cold intolerance and heat intolerance.   Musculoskeletal: Positive for arthralgias. Negative for back pain, gait problem, neck pain and neck stiffness.   Skin: Negative for rash.   Allergic/Immunologic: Positive for environmental allergies. Negative for food allergies.   Neurological: Negative for dizziness, tremors, seizures, syncope, facial asymmetry, speech difficulty, weakness, light-headedness, numbness and headaches.   Hematological: Negative for adenopathy. Does not bruise/bleed easily.   Psychiatric/Behavioral: Negative for behavioral problems, sleep disturbance and suicidal ideas. The patient is not nervous/anxious and is not hyperactive.    :    Past History:  Past  Medical History:   Diagnosis Date   • Hypertension      Past Surgical History:   Procedure Laterality Date   • HERNIA REPAIR       Family History   Problem Relation Age of Onset   • Colon cancer Neg Hx    • Colon polyps Neg Hx      Social History     Tobacco Use   • Smoking status: Current Every Day Smoker     Packs/day: 1.00   • Smokeless tobacco: Never Used   Substance Use Topics   • Alcohol use: Yes     Comment: Social    • Drug use: Yes     Types: Marijuana     Comment: Daily use     Outpatient Medications Marked as Taking for the 9/9/19 encounter (Office Visit) with Manny Whitman PA   Medication Sig Dispense Refill   • lisinopril (PRINIVIL,ZESTRIL) 5 MG tablet 1 tablet by Oral route 1 time per day       Allergies:  Clotrimazole          Vital Signs:   Vitals:    09/09/19 1310   BP: 141/80   Pulse: 78   Temp: 97.8 °F (36.6 °C)         EXAMINATION:   CONSTITUTIONAL: well nourished, alert, oriented, in no acute distress     COMMUNICATION AND VOICE: able to communicate normally, normal voice quality    HEAD: normocephalic, no lesions, atraumatic, no tenderness, no masses     FACE: appearance normal, no lesions, bilateral TMJ tenderness, no deformities, facial motion symmetric    SALIVARY GLANDS: parotid glands with no tenderness, no swelling, no masses, submandibular glands with normal size, nontender    EYES: ocular motility normal, eyelids normal, orbits normal, no proptosis, conjunctiva normal , pupils equal, round     EARS:  Hearing: response to conversational voice normal bilaterally   External Ears: auricles without lesions  Otoscopic: tympanic membrane appearance bilateral dullness, no lesions, no perforation, normal mobility, bilateral effusion    NOSE:  External Nose: structure normal, no tenderness on palpation, no nasal discharge, no lesions, no evidence of trauma, nostrils patent   Intranasal Exam: nasal mucosa erythema, vestibule within normal limits, inferior turbinate normal, nasal septum  midline     ORAL:  Lips: upper and lower lips without lesion   Teeth: dentition within normal limits for age   Gums: gingivae healthy   Oral Mucosa: oral mucosa normal, no mucosal lesions   Floor of Mouth: Warthin’s duct patent, mucosa normal  Tongue: lingual mucosa normal without lesions, normal tongue mobility   Palate: soft and hard palates with normal mucosa and structure  Oropharynx: oropharyngeal mucosa erythema and postnasal drainage    NECK: neck appearance normal, no mass,  noted without erythema or tenderness    THYROID: no overt thyromegaly, no tenderness, nodules or mass present on palpation, position midline     LYMPH NODES: no lymphadenopathy    CHEST/RESPIRATORY: respiratory effort normal, normal breath sounds     CARDIOVASCULAR: rate and rhythm normal, extremities without cyanosis or edema      NEUROLOGIC/PSYCHIATRIC: oriented to time, place and person, mood normal, affect appropriate, CN II-XII intact grossly    RESULTS REVIEW:    I have reviewed the patients old records in the chart.       Assessment    Diagnosis Plan   1. Chronic mucoid otitis media of both ears  azelastine (ASTELIN) 0.1 % nasal spray    Comprehensive Hearing Test    Tympanometry   2. Dysfunction of both eustachian tubes  azelastine (ASTELIN) 0.1 % nasal spray    Comprehensive Hearing Test    Tympanometry   3. Allergic rhinitis, unspecified seasonality, unspecified trigger  azelastine (ASTELIN) 0.1 % nasal spray   4. Bilateral temporomandibular joint pain     5. Chronic sinusitis, unspecified location  azelastine (ASTELIN) 0.1 % nasal spray   6. Tinnitus of both ears  Comprehensive Hearing Test    Tympanometry   7. Bilateral hearing loss, unspecified hearing loss type  Comprehensive Hearing Test    Tympanometry       Plan    Patient Instructions   Follow-up in 6 weeks to recheck ears with audiogram.    TemporoMandibular Joint Syndrome      TMJ is generally caused by the clenching or grinding of the teeth at night, but can happen  during the day, also.    Symptoms of TMJ:    Pain in or around ear, jaw or neck area  Tenderness around ear or jaw  Popping/clicking from joint in jaw  Tension-type headaches  Stiffness/tightening of joint in jaw    Here are some things to avoid that can cause aggravation of your TMJ:    Do not chew gum.  Do not chew on hard candy.  Avoid eating hard to chew foods.  Decrease stress.    You will need to go on a soft food diet for two (2) weeks, especially when you are symptomatic of TMJ.    If taking any medicines for your TMJ, it is best to take those at night to decrease side effects of the medicine.    You will need to purchase a bite block or guard.  You can either purchase a custom fit guard from your dentist or you can purchase one from a local store in the sports section.    TINNITUS MASKERS  (White Noise Signal Generators)    What is Tinnitus?  Tinnitus is a condition where noises in the ear or the head may be heard but which have no external cause.  The intensity and type of noise varies from each sufferer, but the most common reports are of ringing noises and high-pitched pure tones.  These noises may be heard all day or for short spells throughout the day.  This may cause lack of concentration and sleep and in some cases may cause severe discomfort.    What is a Tinnitus Masker?  A tinnitus masker is an electronic device which generates a band of noise of sufficient frequency and intensity to “Mask” the tinnitus so as to distract the sufferers mind from the noises, allowing them to carry on with their normal routines.    Facts about Tinnitus  The word “tinnitus” derives from the Latin works “tinnire” and means to ring or tinkle.  The noise heard by suffers is often said to sound like ringing, roaring, hissing or clicking in the ear.  This noise is heard only by the person suffering from the condition and has no external cause.  Almost everybody may experience noises in the ears or head if quiet ambient  conditions prevail, but these are usually of low intensity and short duration.  When the intensity and duration increase to the point where the noises are heard during normal everyday conditions, the sufferer may experience discomfort and stress.  This is known as Tinnitus.    What causes Tinnitus?  Scientific and medical knowledge about tinnitus is limited, but research has shown the tinnitus may be caused by exposure to loud noises, disease or a hearing loss. But the majority of cases appear to involve the inner ear, the neural pathways between the ear and the brain itself.  Diet can also be a contributing factor, as some foods are generally identified as causing or aggravating tinnitus.     Alcohol excess alcohol can induce tinnitus in almost everyone.   Salt  Reducing salt intake may help reduce body fluids and help tinnitus.   Caffeine May stimulate the nervous system  Cheese (and other dairy products) are the most commonly identified foods causing or aggravating tinnitus.    Cure or Remedy  A cure for tinnitus has not presently been identified as there is insufficient knowledge on the subject.  However, it has been established that a successful method of relieving the symptoms of tinnitus is to mask the noises using a device know as a Tinnitus Masker.  These devices generate electronically a band of noise of sufficient frequency and intensity to “block out” or mask the noises within the ear/head.  For users of hearing aids, the hearing aid on its own may provide sufficient sound to relieve the tinnitus.                               Warning  Tinnitus maskers should not be worn too loud or for long periods continuously as this can damage your hearing.    The Puretone Range of Tinnitus Maskers    Custom Made Devices  These Tinnitus maskers are manufactured from an impression of the ear so it fits snugly, matching your individual ear contours.  They may be made in a variety of sizes to fit in the ear or in the  ear canal.  Large vents are generally fitted so as not to occlude the ear.  Each masker will have a battery compartment and a volume control (with ON/OFF switch) allowing you to select a comfortable but effective level of loudness.    Behind the Ear Devices  A range of tinnitus maskers incorporated into hearing aid cases which fit neatly behind the ear.  Three models are available of different sizes each of which can be fitted with one of three frequency weighted circuits.  Each masker will be fitted with a battery compartment, an ON/Off switch and a used operated tone switch.    Combination Devices  Either of the above devices may be made as a combination hearing aid/tinnitus masker.  This allows it to be used as a hearing aid for normal use and then the masker noise may be mixed with the hearing aid when required.  These are designed for hearing impaired people who suffer from tinnitus.    Standard Canal Devices  Two models make up this range of generic tinnitus maskers which are small enough to fit within the ear canal.  The first model is fitted with a bullet shaped shell and the second is fitted with a contoured shell.  This allows for greater flexibility within a small generic device.  Each model is fitted with a battery compartment and a volume control which incorporates the ON/Off switch.    Desktop/Pillow Devices  These devices can be placed on the desk at home or in the office and used at a distance away from the user.  The pillow masker is fitted with a timer which may be set so that it will switch off the device when the user is asleep.  A slimline external transducer is placed under the pillow, allowing the masking noise to filter through the pillow so as to aid sleep.    For more information:  Quit Now [place]  QUIT-NOW  [email or web address]         New Medications Ordered This Visit   Medications   • azelastine (ASTELIN) 0.1 % nasal spray     Si sprays into the nostril(s)  as directed by provider 2 (Two) Times a Day. Use in each nostril as directed     Dispense:  30 mL     Refill:  11     Orders Placed This Encounter   Procedures   • Comprehensive Hearing Test   • Tympanometry          Return in about 6 weeks (around 10/21/2019) for Recheck jaw and ears with audiogram.    REYNA Cooney  09/18/19  7:23 PM

## 2020-01-08 ENCOUNTER — APPOINTMENT (OUTPATIENT)
Dept: GENERAL RADIOLOGY | Facility: HOSPITAL | Age: 65
End: 2020-01-08

## 2020-01-08 ENCOUNTER — HOSPITAL ENCOUNTER (EMERGENCY)
Facility: HOSPITAL | Age: 65
Discharge: HOME | End: 2020-01-08
Admitting: EMERGENCY MEDICINE

## 2020-01-08 VITALS
SYSTOLIC BLOOD PRESSURE: 133 MMHG | OXYGEN SATURATION: 96 % | DIASTOLIC BLOOD PRESSURE: 93 MMHG | HEIGHT: 68 IN | HEART RATE: 73 BPM | TEMPERATURE: 99.2 F | BODY MASS INDEX: 25.46 KG/M2 | RESPIRATION RATE: 20 BRPM | WEIGHT: 168 LBS

## 2020-01-08 DIAGNOSIS — S60.552A FOREIGN BODY OF LEFT HAND, INITIAL ENCOUNTER: Primary | ICD-10-CM

## 2020-01-08 PROCEDURE — 73140 X-RAY EXAM OF FINGER(S): CPT

## 2020-01-08 PROCEDURE — 99282 EMERGENCY DEPT VISIT SF MDM: CPT

## 2020-01-08 RX ORDER — LIDOCAINE HYDROCHLORIDE 10 MG/ML
10 INJECTION, SOLUTION EPIDURAL; INFILTRATION; INTRACAUDAL; PERINEURAL ONCE
Status: COMPLETED | OUTPATIENT
Start: 2020-01-08 | End: 2020-01-08

## 2020-01-08 RX ORDER — ETODOLAC 200 MG/1
200 CAPSULE ORAL EVERY 8 HOURS
Qty: 15 CAPSULE | Refills: 0 | Status: SHIPPED | OUTPATIENT
Start: 2020-01-08 | End: 2022-01-27

## 2020-01-08 RX ORDER — IBUPROFEN 200 MG
TABLET ORAL 3 TIMES DAILY
Qty: 3.5 G | Refills: 0 | Status: SHIPPED | OUTPATIENT
Start: 2020-01-08 | End: 2022-01-27

## 2020-01-08 RX ORDER — DIAPER,BRIEF,INFANT-TODD,DISP
EACH MISCELLANEOUS EVERY 12 HOURS SCHEDULED
Status: DISCONTINUED | OUTPATIENT
Start: 2020-01-08 | End: 2020-01-08 | Stop reason: HOSPADM

## 2020-01-08 RX ADMIN — LIDOCAINE HYDROCHLORIDE 10 ML: 10 INJECTION, SOLUTION EPIDURAL; INFILTRATION; INTRACAUDAL; PERINEURAL at 20:05

## 2020-01-09 NOTE — ED PROVIDER NOTES
Subjective   History of Present Illness  64-year-old male presents with a chief complaint of foreign body in his palmar aspect of his left thumb.  The patient reports he woke up with it this morning he is unsure how it got there.  No loss range of motion or strength.  He reports that there is some tenderness that is white.  Review of Systems   All other systems reviewed and are negative.      Past Medical History:   Diagnosis Date   • Hypertension        Allergies   Allergen Reactions   • Clotrimazole Swelling and Other (See Comments)     Tongue turned green and fury       Past Surgical History:   Procedure Laterality Date   • HERNIA REPAIR         Family History   Problem Relation Age of Onset   • Colon cancer Neg Hx    • Colon polyps Neg Hx        Social History     Socioeconomic History   • Marital status:      Spouse name: Not on file   • Number of children: Not on file   • Years of education: Not on file   • Highest education level: Not on file   Tobacco Use   • Smoking status: Current Every Day Smoker     Packs/day: 1.00   • Smokeless tobacco: Never Used   Substance and Sexual Activity   • Alcohol use: Yes     Comment: Social    • Drug use: Yes     Types: Marijuana     Comment: Daily use           Objective   Physical Exam   Constitutional: He is oriented to person, place, and time. He appears well-developed and well-nourished.   HENT:   Head: Normocephalic and atraumatic.   Eyes: Pupils are equal, round, and reactive to light. EOM are normal.   Neck: Normal range of motion. Neck supple.   Cardiovascular: Normal rate and regular rhythm.   Pulmonary/Chest: Effort normal and breath sounds normal.   Abdominal: Soft. Bowel sounds are normal.   Musculoskeletal: Normal range of motion.   Neurological: He is alert and oriented to person, place, and time.   Skin: Skin is warm. Capillary refill takes less than 2 seconds.   Left palmar aspect of thumb black foreign body sliverred into skin   Psychiatric: He has  a normal mood and affect. His behavior is normal.   Nursing note and vitals reviewed.      Foreign Body Removal - Embedded  Date/Time: 1/8/2020 8:59 PM  Performed by: Tristen Gardner PA-C  Authorized by: Tristen Gardner PA-C     Consent:     Consent obtained:  Verbal    Consent given by:  Patient    Risks discussed:  Bleeding and infection  Location:     Location: left thenar aspect.  Pre-procedure details:     Imaging:  X-ray    Neurovascular status: intact    Anesthesia (see MAR for exact dosages):     Anesthesia method:  Local infiltration    Local anesthetic:  Lidocaine 1% w/o epi  Procedure type:     Procedure complexity:  Simple  Procedure details:     Scalpel size:  11    Foreign bodies recovered:  1    Description:  Ceramic-hard material, black    Intact foreign body removal: yes    Post-procedure details:     Neurovascular status: intact      Confirmation:  No additional foreign bodies on visualization    Skin closure:  None    Dressing:  Antibiotic ointment    Patient tolerance of procedure:  Tolerated well, no immediate complications               ED Course                                               MDM  Number of Diagnoses or Management Options  Diagnosis management comments: Foreign body removed without difficulty     Risk of Complications, Morbidity, and/or Mortality  Presenting problems: moderate  Diagnostic procedures: moderate  Management options: moderate    Patient Progress  Patient progress: stable      Final diagnoses:   Foreign body of left hand, initial encounter            Tristen Gardner PA-C  01/08/20 [id number]

## 2021-04-12 ENCOUNTER — IMMUNIZATION (OUTPATIENT)
Age: 66
End: 2021-04-12
Payer: MEDICAID

## 2021-04-12 PROCEDURE — 0001A PR IMM ADMN SARSCOV2 30MCG/0.3ML DIL RECON 1ST DOSE: CPT | Performed by: FAMILY MEDICINE

## 2021-04-12 PROCEDURE — 91300 COVID-19, PFIZER VACCINE 30MCG/0.3ML DOSE: CPT | Performed by: FAMILY MEDICINE

## 2021-05-03 ENCOUNTER — IMMUNIZATION (OUTPATIENT)
Age: 66
End: 2021-05-03
Payer: MEDICAID

## 2021-05-03 PROCEDURE — 0002A PR IMM ADMN SARSCOV2 30MCG/0.3ML DIL RECON 2ND DOSE: CPT | Performed by: FAMILY MEDICINE

## 2021-05-03 PROCEDURE — 91300 COVID-19, PFIZER VACCINE 30MCG/0.3ML DOSE: CPT | Performed by: FAMILY MEDICINE

## 2022-01-27 PROCEDURE — U0004 COV-19 TEST NON-CDC HGH THRU: HCPCS | Performed by: NURSE PRACTITIONER

## 2022-03-25 ENCOUNTER — HOSPITAL ENCOUNTER (EMERGENCY)
Facility: HOSPITAL | Age: 67
Discharge: HOME | End: 2022-03-25
Attending: EMERGENCY MEDICINE | Admitting: EMERGENCY MEDICINE

## 2022-03-25 VITALS
TEMPERATURE: 98.3 F | DIASTOLIC BLOOD PRESSURE: 93 MMHG | HEART RATE: 69 BPM | WEIGHT: 168 LBS | SYSTOLIC BLOOD PRESSURE: 181 MMHG | RESPIRATION RATE: 20 BRPM | BODY MASS INDEX: 26.37 KG/M2 | OXYGEN SATURATION: 98 % | HEIGHT: 67 IN

## 2022-03-25 DIAGNOSIS — H93.11 TINNITUS OF RIGHT EAR: ICD-10-CM

## 2022-03-25 DIAGNOSIS — R03.0 ELEVATED BLOOD PRESSURE READING: Primary | ICD-10-CM

## 2022-03-25 DIAGNOSIS — F41.9 ANXIETY: ICD-10-CM

## 2022-03-25 LAB
ALBUMIN SERPL-MCNC: 4.2 G/DL (ref 3.5–5.2)
ALBUMIN/GLOB SERPL: 1.6 G/DL
ALP SERPL-CCNC: 63 U/L (ref 39–117)
ALT SERPL W P-5'-P-CCNC: 16 U/L (ref 1–41)
AMPHET+METHAMPHET UR QL: NEGATIVE
AMPHETAMINES UR QL: NEGATIVE
ANION GAP SERPL CALCULATED.3IONS-SCNC: 8 MMOL/L (ref 5–15)
AST SERPL-CCNC: 19 U/L (ref 1–40)
BARBITURATES UR QL SCN: NEGATIVE
BASOPHILS # BLD AUTO: 0.05 10*3/MM3 (ref 0–0.2)
BASOPHILS NFR BLD AUTO: 0.9 % (ref 0–1.5)
BENZODIAZ UR QL SCN: NEGATIVE
BILIRUB SERPL-MCNC: 0.6 MG/DL (ref 0–1.2)
BUN SERPL-MCNC: 16 MG/DL (ref 8–23)
BUN/CREAT SERPL: 20.8 (ref 7–25)
BUPRENORPHINE SERPL-MCNC: NEGATIVE NG/ML
CALCIUM SPEC-SCNC: 9.4 MG/DL (ref 8.6–10.5)
CANNABINOIDS SERPL QL: NEGATIVE
CHLORIDE SERPL-SCNC: 106 MMOL/L (ref 98–107)
CO2 SERPL-SCNC: 26 MMOL/L (ref 22–29)
COCAINE UR QL: NEGATIVE
CREAT SERPL-MCNC: 0.77 MG/DL (ref 0.76–1.27)
DEPRECATED RDW RBC AUTO: 38.8 FL (ref 37–54)
EGFRCR SERPLBLD CKD-EPI 2021: 98.7 ML/MIN/1.73
EOSINOPHIL # BLD AUTO: 0.17 10*3/MM3 (ref 0–0.4)
EOSINOPHIL NFR BLD AUTO: 3.1 % (ref 0.3–6.2)
ERYTHROCYTE [DISTWIDTH] IN BLOOD BY AUTOMATED COUNT: 12.3 % (ref 12.3–15.4)
GLOBULIN UR ELPH-MCNC: 2.7 GM/DL
GLUCOSE SERPL-MCNC: 101 MG/DL (ref 65–99)
HCT VFR BLD AUTO: 39.9 % (ref 37.5–51)
HGB BLD-MCNC: 13.1 G/DL (ref 13–17.7)
IMM GRANULOCYTES # BLD AUTO: 0.01 10*3/MM3 (ref 0–0.05)
IMM GRANULOCYTES NFR BLD AUTO: 0.2 % (ref 0–0.5)
LYMPHOCYTES # BLD AUTO: 1.48 10*3/MM3 (ref 0.7–3.1)
LYMPHOCYTES NFR BLD AUTO: 27.3 % (ref 19.6–45.3)
MAGNESIUM SERPL-MCNC: 2.1 MG/DL (ref 1.6–2.4)
MCH RBC QN AUTO: 28.5 PG (ref 26.6–33)
MCHC RBC AUTO-ENTMCNC: 32.8 G/DL (ref 31.5–35.7)
MCV RBC AUTO: 86.7 FL (ref 79–97)
METHADONE UR QL SCN: NEGATIVE
MONOCYTES # BLD AUTO: 0.4 10*3/MM3 (ref 0.1–0.9)
MONOCYTES NFR BLD AUTO: 7.4 % (ref 5–12)
NEUTROPHILS NFR BLD AUTO: 3.31 10*3/MM3 (ref 1.7–7)
NEUTROPHILS NFR BLD AUTO: 61.1 % (ref 42.7–76)
NRBC BLD AUTO-RTO: 0 /100 WBC (ref 0–0.2)
OPIATES UR QL: NEGATIVE
OXYCODONE UR QL SCN: NEGATIVE
PCP UR QL SCN: NEGATIVE
PLATELET # BLD AUTO: 192 10*3/MM3 (ref 140–450)
PMV BLD AUTO: 10.1 FL (ref 6–12)
POTASSIUM SERPL-SCNC: 4.5 MMOL/L (ref 3.5–5.2)
PROPOXYPH UR QL: NEGATIVE
PROT SERPL-MCNC: 6.9 G/DL (ref 6–8.5)
QT INTERVAL: 424 MS
QTC INTERVAL: 424 MS
RBC # BLD AUTO: 4.6 10*6/MM3 (ref 4.14–5.8)
SODIUM SERPL-SCNC: 140 MMOL/L (ref 136–145)
TRICYCLICS UR QL SCN: NEGATIVE
TSH SERPL DL<=0.05 MIU/L-ACNC: 3.96 UIU/ML (ref 0.27–4.2)
WBC NRBC COR # BLD: 5.42 10*3/MM3 (ref 3.4–10.8)

## 2022-03-25 PROCEDURE — 99283 EMERGENCY DEPT VISIT LOW MDM: CPT

## 2022-03-25 PROCEDURE — 93010 ELECTROCARDIOGRAM REPORT: CPT | Performed by: INTERNAL MEDICINE

## 2022-03-25 PROCEDURE — 93005 ELECTROCARDIOGRAM TRACING: CPT | Performed by: EMERGENCY MEDICINE

## 2022-03-25 PROCEDURE — 80306 DRUG TEST PRSMV INSTRMNT: CPT | Performed by: EMERGENCY MEDICINE

## 2022-03-25 PROCEDURE — 36415 COLL VENOUS BLD VENIPUNCTURE: CPT

## 2022-03-25 PROCEDURE — 80050 GENERAL HEALTH PANEL: CPT | Performed by: EMERGENCY MEDICINE

## 2022-03-25 PROCEDURE — 83735 ASSAY OF MAGNESIUM: CPT | Performed by: EMERGENCY MEDICINE

## 2022-03-25 RX ORDER — LORAZEPAM 2 MG/ML
0.5 INJECTION INTRAMUSCULAR ONCE
Status: DISCONTINUED | OUTPATIENT
Start: 2022-03-25 | End: 2022-03-25 | Stop reason: HOSPADM

## 2022-03-25 NOTE — ED PROVIDER NOTES
Subjective   Patient presents to the ER with complaints of shaking and elevated blood pressure this evening.  Patient states he began having sweaty palms and sweating behind the knees this evening with shaking and leg pain.  Patient states that he took his blood pressure while this was going on and he noted it to be 199 systolic.  This concerned him and came to emergency department for evaluation.  He now feels much better and is considering that possibly he had a panic attack.  He states that he is retired now and thinks he is having anxiety because of this.  He states he has been having ringing in his ear nonstop on the right side for r the past year and thinks he needs to see an ENT.  He also notes that he could not breathe out of the right side of his nose for some time and then 1 day blew a black scab out of his nose and since then has been able to breathe just fine.  He states though that he since that time has been blowing stuff out his nose and coughing stuff up.  He denies fever, nausea, vomiting, shortness of breath and review of systems other than noted above is noncontributory.          Review of Systems   All other systems reviewed and are negative.      Past Medical History:   Diagnosis Date   • Hypertension        Allergies   Allergen Reactions   • Clotrimazole Swelling and Other (See Comments)     Tongue turned green and fury       Past Surgical History:   Procedure Laterality Date   • HERNIA REPAIR         Family History   Problem Relation Age of Onset   • Colon cancer Neg Hx    • Colon polyps Neg Hx        Social History     Socioeconomic History   • Marital status:    Tobacco Use   • Smoking status: Current Every Day Smoker     Packs/day: 1.00   • Smokeless tobacco: Never Used   Substance and Sexual Activity   • Alcohol use: Not Currently     Comment: Social    • Drug use: Yes     Types: Marijuana     Comment: Daily use           Objective   Physical Exam  Vitals and nursing note reviewed.    Constitutional:       General: He is not in acute distress.     Appearance: Normal appearance. He is normal weight. He is not ill-appearing, toxic-appearing or diaphoretic.   HENT:      Head: Normocephalic and atraumatic.      Right Ear: Tympanic membrane, ear canal and external ear normal. There is no impacted cerumen.      Left Ear: Tympanic membrane, ear canal and external ear normal. There is no impacted cerumen.      Nose: Nose normal. No congestion or rhinorrhea.      Mouth/Throat:      Mouth: Mucous membranes are moist.      Pharynx: Oropharynx is clear.   Eyes:      General:         Right eye: No discharge.         Left eye: No discharge.      Extraocular Movements: Extraocular movements intact.      Conjunctiva/sclera: Conjunctivae normal.   Cardiovascular:      Rate and Rhythm: Normal rate and regular rhythm.      Pulses: Normal pulses.      Heart sounds: Normal heart sounds. No murmur heard.    No gallop.   Pulmonary:      Effort: Pulmonary effort is normal. No respiratory distress.      Breath sounds: Normal breath sounds. No stridor. No wheezing, rhonchi or rales.   Chest:      Chest wall: No tenderness.   Abdominal:      General: Abdomen is flat. There is no distension.      Palpations: Abdomen is soft. There is no mass.      Tenderness: There is no abdominal tenderness. There is no guarding.      Hernia: No hernia is present.   Musculoskeletal:         General: Normal range of motion.      Cervical back: Normal range of motion and neck supple.      Right lower leg: No edema.      Left lower leg: No edema.   Skin:     General: Skin is warm and dry.   Neurological:      General: No focal deficit present.      Mental Status: He is alert and oriented to person, place, and time. Mental status is at baseline.   Psychiatric:         Mood and Affect: Mood normal.         Behavior: Behavior normal.         Thought Content: Thought content normal.         Judgment: Judgment normal.         ECG 12  Lead      Date/Time: 3/25/2022 3:14 AM  Performed by: Francois Michelle DO  Authorized by: Francois Michelle DO   Interpreted by physician  Comparison: not compared with previous ECG   Rhythm: sinus rhythm  Rate: normal  QRS axis: left  ST Segments: ST segments normal  T Waves: T waves normal  Clinical impression: non-specific ECG  Comments: Left axis deviation otherwise normal appearing EKG.                 ED Course                                                 MDM  Number of Diagnoses or Management Options  Anxiety: new and requires workup  Elevated blood pressure reading: established and worsening  Tinnitus of right ear: new and requires workup     Amount and/or Complexity of Data Reviewed  Clinical lab tests: ordered and reviewed    Risk of Complications, Morbidity, and/or Mortality  Presenting problems: low  Diagnostic procedures: low  Management options: low    Patient Progress  Patient progress: improved      Final diagnoses:   Elevated blood pressure reading   Anxiety   Tinnitus of right ear       ED Disposition  ED Disposition     ED Disposition   Discharge    Condition   Stable    Comment   --             Neal Eric MD  [address] [facility] [address] [facility] [address] [place] [id number]  [phone]    Schedule an appointment as soon as possible for a visit today           Medication List      No changes were made to your prescriptions during this visit.          Francois Michelle DO  22 [id number]

## 2022-05-26 ENCOUNTER — OFFICE VISIT (OUTPATIENT)
Dept: OTOLARYNGOLOGY | Facility: CLINIC | Age: 67
End: 2022-05-26

## 2022-05-26 VITALS
TEMPERATURE: 98.8 F | BODY MASS INDEX: 26.78 KG/M2 | HEIGHT: 67 IN | DIASTOLIC BLOOD PRESSURE: 94 MMHG | WEIGHT: 170.6 LBS | HEART RATE: 70 BPM | SYSTOLIC BLOOD PRESSURE: 166 MMHG

## 2022-05-26 DIAGNOSIS — J30.2 SEASONAL ALLERGIC RHINITIS, UNSPECIFIED TRIGGER: ICD-10-CM

## 2022-05-26 DIAGNOSIS — H69.83 DYSFUNCTION OF BOTH EUSTACHIAN TUBES: Primary | ICD-10-CM

## 2022-05-26 DIAGNOSIS — H93.13 TINNITUS OF BOTH EARS: ICD-10-CM

## 2022-05-26 PROCEDURE — 99214 OFFICE O/P EST MOD 30 MIN: CPT | Performed by: EMERGENCY MEDICINE

## 2022-05-26 RX ORDER — MOMETASONE FUROATE 50 UG/1
2 SPRAY, METERED NASAL 2 TIMES DAILY
Qty: 17 G | Refills: 11 | Status: SHIPPED | OUTPATIENT
Start: 2022-05-26

## 2022-05-26 NOTE — PROGRESS NOTES
LESTER Hollis  [facility] EAR NOSE & THROAT  [address], [address]  [facility] [facility] [address]  Fax [phone]  Phone [phone]      Visit Type: FOLLOW UP   Chief Complaint   Patient presents with   • Ear Problem        HPI  He presents for a follow up evaluation. He has had complaints of tinnitus and nasal drainage. The symptoms are not localized to a particular location. The symptoms severity was described as: mild to moderate The symptoms have been: relatively constant for the last several years There have been no identified factors that aggravate the symptoms. He was previously prescribed flonase but stopped using it.      Past Medical History:   Diagnosis Date   • Hypertension        Past Surgical History:   Procedure Laterality Date   • HERNIA REPAIR         Family History: His family history is not on file.     Social History: He  reports that he has been smoking cigarettes. He started smoking about 50 years ago. He has been smoking about 1.00 pack per day. He has never used smokeless tobacco. He reports previous alcohol use. He reports current drug use. Drug: Marijuana.    Home Medications:  lisinopril and mometasone    Allergies:  He is allergic to clotrimazole.       Vital Signs:   Temp:  [98.8 °F (37.1 °C)] 98.8 °F (37.1 °C)  Heart Rate:  [70] 70  BP: (166)/(94) 166/94  ENT Physical Exam  Constitutional  Appearance: patient appears well-developed, well-nourished and well-groomed,  Communication/Voice: communication appropriate for developmental age; vocal quality normal;  Head and Face  Appearance: head appears normal, face appears normal and face appears atraumatic;  Palpation: facial palpation normal;  Salivary: glands normal;  Ear  Hearing: intact;  Auricles: right auricle normal; left auricle normal;  External Mastoids: right external mastoid normal; left external mastoid normal;  Ear Canals: right ear canal normal; left ear canal  normal;  Tympanic Membranes: right tympanic membrane normal; left tympanic membrane normal;  Nose  Internal Nose: bilateral intranasal mucosa edematous; nasal septal deviation present; bilateral inferior turbinates with hypertrophy;  Oral Cavity/Oropharynx  Lips: normal;  Teeth: normal;  Gums: gingiva normal;  Tongue: normal;  Oral mucosa: normal;  Hard palate: normal;  Neck  Neck: neck normal; neck palpation normal;  Thyroid: thyroid normal;         Result Review    RESULTS REVIEW    I have reviewed the patients old records in the chart.     Assessment & Plan    Diagnoses and all orders for this visit:    1. Dysfunction of both eustachian tubes (Primary)  -     Comprehensive Hearing Test; Future    2. Tinnitus of both ears  -     Comprehensive Hearing Test; Future    3. Seasonal allergic rhinitis, unspecified trigger    Other orders  -     mometasone (NASONEX) 50 MCG/ACT nasal spray; 2 sprays into the nostril(s) as directed by provider 2 (Two) Times a Day.  Dispense: 17 g; Refill: 11            Use hearing protection in loud noise situations.  Use home masking techniques- use low sound level of a pleasant sound like a fan or radio at night to drown out the tinnitus sound. If not working, can consider formal masking device through our hearing aid department.  Obtain a yearly audiogram to follow hearing.      Return for Follow up with Audiogram, Follow up with LESTER Dueñas.      LESTER Hollis  05/26/22  14:08 CDT

## 2022-06-09 ENCOUNTER — TELEPHONE (OUTPATIENT)
Dept: OTOLARYNGOLOGY | Facility: CLINIC | Age: 67
End: 2022-06-09

## 2022-06-09 NOTE — TELEPHONE ENCOUNTER
Call placed to patient and informed him of denial for Mometasone and he should begin OTC Nasonex per packaging instructions. Patient verbalized understanding.

## 2022-08-18 NOTE — PROGRESS NOTES
"AUDIOMETRIC EVALUATION      Name:  Domingo Evans  :  1955  Age:  66 y.o.  Date of Evaluation:  2022       History:  Reason for visit:  Mr. Evans is seen today at the request of Neal Eric MD for a hearing evaluation. Patient was seen by ENT provider on 2022 with complaints of tinnitus. Patient reports constant bilateral \"cricket\" tinnitus, with his right ear more noticeable than the left. This began about a year ago. Patient feels he probably has some hearing loss. He states he worked around loud noises for most of his life. Patient has not had his hearing tested as an adult.     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  Aural Fullness:  yes, right ear  Otalgia: no, both ears  Family history of hearing loss: no  Other significant history: high blood pressure  Head trauma requiring hospital stay: no  Previous brain MRI: no      EVALUATION:        RESULTS:    Otoscopic Evaluation:  Bilateral: clear canal, tympanic membrane visualized     Tympanometry (226 Hz):  Bilateral: Type A- normal    Pure Tone Audiometry:    Right: normal through 1kHz, precipitously sloping to moderately severe sensorineural hearing loss   Left: normal through 1kHz, precipitously sloping to moderately severe sensorineural hearing loss rising back to mild at 8kHz    Speech Audiometry:   Right: Speech Reception Threshold (SRT) was obtained at 25 dBHL  Word Recognition scores- good (78 - 88%) using NU-6 List 4A, 25 words  Left: Speech Reception Threshold (SRT) was obtained at 20 dBHL  Word Recognition scores- good (78 - 88%) using NU-6 List 4A, 25 words    IMPRESSIONS:  Tympanometry showed normal middle ear pressure and static compliance, for both ears. Pure tone thresholds for the right ear show a normal precipitously sloping to " moderately severe sensorineural hearing loss and the left ear shows a normal precipitously sloping to moderately severe sensorineural hearing loss rising back to mild. Results suggest normal outer/middle ear function and abnormal cochlear/retrocochlear function, bilaterally. Left ear pure tones are worse than the right at 1.5-2.0kHz. Right ear pure tones are significantly worse than the left at 8kHz. Patient was counseled with regard to the findings.    Amplification needs:  Patient could benefit from hearing aids. Patient's word recognition scores were good. Patient might have relief from their tinnitus with hearing aid use.    Diagnosis:  1. Sensorineural hearing loss (SNHL) of both ears    2. Tinnitus of both ears         RECOMMENDATIONS/PLAN:  Follow-up recommendations per LESTER Dueñas    Audiologic follow-up in 1 year  Return for audiologic testing if noticing changes in hearing or concerns arise  Hearing aid evaluation and counseling upon medical clearance and patient motivation  Use communication strategies  Use hearing protection around loud noises  Avoid silence when possible. Sleep with white noise/fan, or listen to nature sounds      EDUCATION:  Provided tinnitus information from [employer].  Discussed results and recommendations with patient. Questions were addressed and the patient was encouraged to contact our department should concerns arise.        Obdulia Landeros [facility]  Licensed Audiologist

## 2022-08-19 ENCOUNTER — PROCEDURE VISIT (OUTPATIENT)
Dept: OTOLARYNGOLOGY | Facility: CLINIC | Age: 67
End: 2022-08-19

## 2022-08-19 ENCOUNTER — OFFICE VISIT (OUTPATIENT)
Dept: OTOLARYNGOLOGY | Facility: CLINIC | Age: 67
End: 2022-08-19

## 2022-08-19 VITALS
BODY MASS INDEX: 24.96 KG/M2 | HEIGHT: 67 IN | HEART RATE: 57 BPM | DIASTOLIC BLOOD PRESSURE: 74 MMHG | WEIGHT: 159 LBS | TEMPERATURE: 97.6 F | SYSTOLIC BLOOD PRESSURE: 155 MMHG

## 2022-08-19 DIAGNOSIS — H93.13 TINNITUS OF BOTH EARS: ICD-10-CM

## 2022-08-19 DIAGNOSIS — H90.3 SENSORINEURAL HEARING LOSS (SNHL), BILATERAL: Primary | ICD-10-CM

## 2022-08-19 DIAGNOSIS — H90.3 SENSORINEURAL HEARING LOSS (SNHL) OF BOTH EARS: Primary | ICD-10-CM

## 2022-08-19 PROCEDURE — 92567 TYMPANOMETRY: CPT

## 2022-08-19 PROCEDURE — 92557 COMPREHENSIVE HEARING TEST: CPT

## 2022-08-19 PROCEDURE — 99213 OFFICE O/P EST LOW 20 MIN: CPT | Performed by: EMERGENCY MEDICINE

## 2022-08-19 NOTE — PROGRESS NOTES
LESTER Hollis  [facility] EAR NOSE & THROAT  [address], [address]  [place] [address]  Fax [phone]  Phone [phone]      Visit Type: FOLLOW UP   Chief Complaint   Patient presents with   • Tinnitus     Follow up with audio        HPI  He presents for a follow up evaluation. He has had continued problems with ear fullness and tinnitus. The symptoms are not localized to a particular location. The symptoms severity was described as: mild The symptoms have been: relatively constant for the last several years There have been no identified factors that aggravate the symptoms. The patient has been treated with intranasal fluticasone in the past with some improvement, however, he is not using it currently.      Past Medical History:   Diagnosis Date   • Hypertension    • Hypertension        Past Surgical History:   Procedure Laterality Date   • HERNIA REPAIR         Family History: His family history is not on file.     Social History: He  reports that he has been smoking cigarettes. He started smoking about 50 years ago. He has been smoking about 1.00 pack per day. He has never used smokeless tobacco. He reports previous alcohol use. He reports current drug use. Drug: Marijuana.    Home Medications:  lisinopril and mometasone    Allergies:  He is allergic to clotrimazole.       Vital Signs:   Temp:  [97.6 °F (36.4 °C)] 97.6 °F (36.4 °C)  Heart Rate:  [57] 57  BP: (155)/(74) 155/74  ENT Physical Exam  Ear  Hearing: intact;  Auricles: right auricle normal; left auricle normal;  External Mastoids: right external mastoid normal; left external mastoid normal;  Ear Canals: right ear canal normal; left ear canal normal;  Tympanic Membranes: right tympanic membrane normal; left tympanic membrane normal;         Result Review    RESULTS REVIEW    I have reviewed the patients old records in the chart.   The following results/records were reviewed:    Procedure visit with Aida Maria AUD (08/19/2022) type A bilaterally with SNHL bilaterally      Assessment & Plan    Diagnoses and all orders for this visit:    1. Sensorineural hearing loss (SNHL), bilateral (Primary)  -     Ambulatory Referral to Audiology            Use hearing protection in loud noise situations.  Consider hearing aid amplification.  Obtain a yearly audiogram to follow hearing.      Return if symptoms worsen or fail to improve.      Hoa Reese, LESTER  08/19/22  10:33 CDT

## 2024-01-22 ENCOUNTER — APPOINTMENT (OUTPATIENT)
Dept: GENERAL RADIOLOGY | Facility: HOSPITAL | Age: 69
End: 2024-01-22
Payer: MEDICARE

## 2024-01-22 PROCEDURE — 71046 X-RAY EXAM CHEST 2 VIEWS: CPT

## 2025-06-17 ENCOUNTER — HOSPITAL ENCOUNTER (EMERGENCY)
Facility: HOSPITAL | Age: 70
Discharge: HOME | End: 2025-06-17
Admitting: FAMILY MEDICINE
Payer: MEDICARE

## 2025-06-17 ENCOUNTER — APPOINTMENT (OUTPATIENT)
Dept: CT IMAGING | Facility: HOSPITAL | Age: 70
End: 2025-06-17
Payer: MEDICARE

## 2025-06-17 VITALS
WEIGHT: 166.5 LBS | DIASTOLIC BLOOD PRESSURE: 90 MMHG | BODY MASS INDEX: 25.23 KG/M2 | HEART RATE: 84 BPM | HEIGHT: 68 IN | OXYGEN SATURATION: 98 % | SYSTOLIC BLOOD PRESSURE: 175 MMHG | RESPIRATION RATE: 20 BRPM | TEMPERATURE: 98.2 F

## 2025-06-17 DIAGNOSIS — K57.92 DIVERTICULITIS: Primary | ICD-10-CM

## 2025-06-17 LAB
ALBUMIN SERPL-MCNC: 4.4 G/DL (ref 3.5–5.2)
ALBUMIN/GLOB SERPL: 1.2 G/DL
ALP SERPL-CCNC: 60 U/L (ref 39–117)
ALT SERPL W P-5'-P-CCNC: 16 U/L (ref 1–41)
ANION GAP SERPL CALCULATED.3IONS-SCNC: 13 MMOL/L (ref 5–15)
AST SERPL-CCNC: 19 U/L (ref 1–40)
BACTERIA UR QL AUTO: ABNORMAL /HPF
BASOPHILS # BLD AUTO: 0.05 10*3/MM3 (ref 0–0.2)
BASOPHILS NFR BLD AUTO: 0.5 % (ref 0–1.5)
BILIRUB SERPL-MCNC: 2.2 MG/DL (ref 0–1.2)
BILIRUB UR QL STRIP: NEGATIVE
BUN SERPL-MCNC: 18.7 MG/DL (ref 8–23)
BUN/CREAT SERPL: 21 (ref 7–25)
CALCIUM SPEC-SCNC: 9.6 MG/DL (ref 8.6–10.5)
CHLORIDE SERPL-SCNC: 99 MMOL/L (ref 98–107)
CLARITY UR: CLEAR
CO2 SERPL-SCNC: 24 MMOL/L (ref 22–29)
COLOR UR: ABNORMAL
CREAT SERPL-MCNC: 0.89 MG/DL (ref 0.76–1.27)
DEPRECATED RDW RBC AUTO: 38.9 FL (ref 37–54)
EGFRCR SERPLBLD CKD-EPI 2021: 92.8 ML/MIN/1.73
EOSINOPHIL # BLD AUTO: 0.07 10*3/MM3 (ref 0–0.4)
EOSINOPHIL NFR BLD AUTO: 0.7 % (ref 0.3–6.2)
ERYTHROCYTE [DISTWIDTH] IN BLOOD BY AUTOMATED COUNT: 12.4 % (ref 12.3–15.4)
GLOBULIN UR ELPH-MCNC: 3.6 GM/DL
GLUCOSE SERPL-MCNC: 100 MG/DL (ref 65–99)
GLUCOSE UR STRIP-MCNC: NEGATIVE MG/DL
HCT VFR BLD AUTO: 44 % (ref 37.5–51)
HGB BLD-MCNC: 14.6 G/DL (ref 13–17.7)
HGB UR QL STRIP.AUTO: NEGATIVE
HYALINE CASTS UR QL AUTO: ABNORMAL /LPF
IMM GRANULOCYTES # BLD AUTO: 0.03 10*3/MM3 (ref 0–0.05)
IMM GRANULOCYTES NFR BLD AUTO: 0.3 % (ref 0–0.5)
KETONES UR QL STRIP: ABNORMAL
LEUKOCYTE ESTERASE UR QL STRIP.AUTO: ABNORMAL
LIPASE SERPL-CCNC: 34 U/L (ref 13–60)
LYMPHOCYTES # BLD AUTO: 1.45 10*3/MM3 (ref 0.7–3.1)
LYMPHOCYTES NFR BLD AUTO: 13.7 % (ref 19.6–45.3)
MCH RBC QN AUTO: 28.4 PG (ref 26.6–33)
MCHC RBC AUTO-ENTMCNC: 33.2 G/DL (ref 31.5–35.7)
MCV RBC AUTO: 85.6 FL (ref 79–97)
MONOCYTES # BLD AUTO: 0.87 10*3/MM3 (ref 0.1–0.9)
MONOCYTES NFR BLD AUTO: 8.2 % (ref 5–12)
NEUTROPHILS NFR BLD AUTO: 76.6 % (ref 42.7–76)
NEUTROPHILS NFR BLD AUTO: 8.11 10*3/MM3 (ref 1.7–7)
NITRITE UR QL STRIP: NEGATIVE
NRBC BLD AUTO-RTO: 0 /100 WBC (ref 0–0.2)
PH UR STRIP.AUTO: 5.5 [PH] (ref 5–8)
PLATELET # BLD AUTO: 218 10*3/MM3 (ref 140–450)
PMV BLD AUTO: 10.1 FL (ref 6–12)
POTASSIUM SERPL-SCNC: 4.3 MMOL/L (ref 3.5–5.2)
PROT SERPL-MCNC: 8 G/DL (ref 6–8.5)
PROT UR QL STRIP: ABNORMAL
RBC # BLD AUTO: 5.14 10*6/MM3 (ref 4.14–5.8)
RBC # UR STRIP: ABNORMAL /HPF
REF LAB TEST METHOD: ABNORMAL
SODIUM SERPL-SCNC: 136 MMOL/L (ref 136–145)
SP GR UR STRIP: 1.02 (ref 1–1.03)
SQUAMOUS #/AREA URNS HPF: ABNORMAL /HPF
UROBILINOGEN UR QL STRIP: ABNORMAL
WBC # UR STRIP: ABNORMAL /HPF
WBC NRBC COR # BLD AUTO: 10.58 10*3/MM3 (ref 3.4–10.8)

## 2025-06-17 PROCEDURE — 80053 COMPREHEN METABOLIC PANEL: CPT | Performed by: PHYSICIAN ASSISTANT

## 2025-06-17 PROCEDURE — 83690 ASSAY OF LIPASE: CPT | Performed by: PHYSICIAN ASSISTANT

## 2025-06-17 PROCEDURE — 74177 CT ABD & PELVIS W/CONTRAST: CPT

## 2025-06-17 PROCEDURE — 99285 EMERGENCY DEPT VISIT HI MDM: CPT

## 2025-06-17 PROCEDURE — 25510000001 IOPAMIDOL 61 % SOLUTION: Performed by: PHYSICIAN ASSISTANT

## 2025-06-17 PROCEDURE — 85025 COMPLETE CBC W/AUTO DIFF WBC: CPT | Performed by: PHYSICIAN ASSISTANT

## 2025-06-17 PROCEDURE — 96360 HYDRATION IV INFUSION INIT: CPT

## 2025-06-17 PROCEDURE — 25810000003 SODIUM CHLORIDE 0.9 % SOLUTION: Performed by: PHYSICIAN ASSISTANT

## 2025-06-17 PROCEDURE — 63710000001 ONDANSETRON ODT 4 MG TABLET DISPERSIBLE: Performed by: PHYSICIAN ASSISTANT

## 2025-06-17 PROCEDURE — 81001 URINALYSIS AUTO W/SCOPE: CPT | Performed by: PHYSICIAN ASSISTANT

## 2025-06-17 RX ORDER — METRONIDAZOLE 500 MG/1
500 TABLET ORAL 3 TIMES DAILY
Qty: 30 TABLET | Refills: 0 | Status: SHIPPED | OUTPATIENT
Start: 2025-06-17 | End: 2025-06-27

## 2025-06-17 RX ORDER — METRONIDAZOLE 500 MG/1
500 TABLET ORAL ONCE
Status: COMPLETED | OUTPATIENT
Start: 2025-06-17 | End: 2025-06-17

## 2025-06-17 RX ORDER — CIPROFLOXACIN 500 MG/1
500 TABLET, FILM COATED ORAL 2 TIMES DAILY
Qty: 20 TABLET | Refills: 0 | Status: SHIPPED | OUTPATIENT
Start: 2025-06-17 | End: 2025-06-27

## 2025-06-17 RX ORDER — SODIUM CHLORIDE 0.9 % (FLUSH) 0.9 %
10 SYRINGE (ML) INJECTION AS NEEDED
Status: DISCONTINUED | OUTPATIENT
Start: 2025-06-17 | End: 2025-06-17 | Stop reason: HOSPADM

## 2025-06-17 RX ORDER — IOPAMIDOL 612 MG/ML
100 INJECTION, SOLUTION INTRAVASCULAR
Status: COMPLETED | OUTPATIENT
Start: 2025-06-17 | End: 2025-06-17

## 2025-06-17 RX ORDER — CIPROFLOXACIN 500 MG/1
500 TABLET, FILM COATED ORAL ONCE
Status: COMPLETED | OUTPATIENT
Start: 2025-06-17 | End: 2025-06-17

## 2025-06-17 RX ORDER — ONDANSETRON 4 MG/1
4 TABLET, ORALLY DISINTEGRATING ORAL ONCE
Status: COMPLETED | OUTPATIENT
Start: 2025-06-17 | End: 2025-06-17

## 2025-06-17 RX ORDER — ONDANSETRON 4 MG/1
4 TABLET, ORALLY DISINTEGRATING ORAL EVERY 8 HOURS PRN
Qty: 30 TABLET | Refills: 0 | Status: SHIPPED | OUTPATIENT
Start: 2025-06-17 | End: 2025-06-27

## 2025-06-17 RX ADMIN — CIPROFLOXACIN 500 MG: 500 TABLET, FILM COATED ORAL at 17:25

## 2025-06-17 RX ADMIN — ONDANSETRON 4 MG: 4 TABLET, ORALLY DISINTEGRATING ORAL at 17:25

## 2025-06-17 RX ADMIN — METRONIDAZOLE 500 MG: 500 TABLET ORAL at 17:25

## 2025-06-17 RX ADMIN — SODIUM CHLORIDE 1000 ML: 9 INJECTION, SOLUTION INTRAVENOUS at 14:47

## 2025-06-17 RX ADMIN — IOPAMIDOL 100 ML: 612 INJECTION, SOLUTION INTRAVENOUS at 15:03

## 2025-06-17 NOTE — ED PROVIDER NOTES
Subjective   History of Present Illness 69-year-old male presents to the ER with complaint of lower abdominal pain for the last 2 days and then today he felt like he had stabbing in his lower abdomen that woke him up at 10 AM this morning.  He has had some funny looking stools as well that looked a little orange and mucousy.  He has had previous surgery in the left lower quadrant for hernia.    Review of Systems no fever chills cough congestion chest pain shortness of breath.  No nausea vomiting diarrhea.  Back positive abdominal pain lower abdomen.    Past Medical History:   Diagnosis Date    Hypertension     Hypertension        Allergies   Allergen Reactions    Clotrimazole Swelling and Other (See Comments)     Tongue turned green and fury       Past Surgical History:   Procedure Laterality Date    HERNIA REPAIR         Family History   Problem Relation Age of Onset    Colon cancer Neg Hx     Colon polyps Neg Hx        Social History     Socioeconomic History    Marital status:    Tobacco Use    Smoking status: Every Day     Current packs/day: 1.00     Average packs/day: 1 pack/day for 53.5 years (53.5 ttl pk-yrs)     Types: Cigarettes     Start date: 1972    Smokeless tobacco: Never   Vaping Use    Vaping status: Never Used   Substance and Sexual Activity    Alcohol use: Not Currently     Comment: Social     Drug use: Yes     Types: Marijuana     Comment: Daily use    Sexual activity: Defer           Objective   Physical Exam  HEENT: EOMI PERRL, nares patent, mucous membranes moist  Neck: No lymphadenopathy  Chest: Clear to auscultation bilaterally without wheezing rhonchi or rales,  Cardiovascular: Regular rate and rhythm without murmur  Abdomen: Soft good bowel sounds lower abdominal pain with left and right lower quadrant pain.  CVA tenderness.  Skin: No rash  Musculoskeletal: Full active range of motion with good distal pulses  Neuro: Alert and oriented x 3          Procedures           ED Course         Ordered CBC CMP lipase CT abdomen pelvis with IV contrast.    Differential would be small bowel obstruction, acute appendicitis, diverticulitis.                                   CMP normal, urinalysis was normal as well.  Urine shows no red or white blood cells ketones with 15 trace protein, lipase 34,    CT abdomen and pelvis with IV contrast IMPRESSION:     1.  Acute uncomplicated sigmoid diverticulitis. No evidence of free air  or abscess.     2.  Hepatic steatosis.     3.  Diffuse urinary bladder wall thickening. This may be secondary to  adjacent sigmoid inflammation but acute cystitis and sequela of chronic  partial outlet obstruction is also in the differential. Prostate is  mildly enlarged.                Medical Decision Making  Problems Addressed:  Diverticulitis: complicated acute illness or injury    Amount and/or Complexity of Data Reviewed  Labs: ordered.  Radiology: ordered.    Risk  Prescription drug management.    Patient presented to the ER with sharp abdominal pain for the last 3 days.  When he had worked him up got CBC CMP CT and it showed acute uncomplicated sigmoid diverticulitis.  I did speak with the surgeon but since his uncomplicated if he wants to stay he can be admitted to the hospitalist service and he would come and see him next day.  However by the time he got back to the patient he stated that he rather just go home.  Show given his first dose of Cipro Flagyl and Zofran today.  And a handout discussing how to proceed.  Case discussed with Dr. Hahn agrees with treatment plan and discharge    Final diagnoses:   Diverticulitis       ED Disposition  ED Disposition       ED Disposition   Discharge    Condition   Stable    Comment   --               Avelino Mota Jr., MD  [address] [facility] [address]  [phone]      Call to schedule follow-up appointment         Medication List        New Prescriptions      ciprofloxacin 500 MG tablet  Commonly known as: CIPRO  Take 1 tablet  by mouth 2 (Two) Times a Day for 10 days.     metroNIDAZOLE 500 MG tablet  Commonly known as: FLAGYL  Take 1 tablet by mouth 3 (Three) Times a Day for 10 days.     ondansetron ODT 4 MG disintegrating tablet  Commonly known as: ZOFRAN-ODT  Take 1 tablet by mouth Every 8 (Eight) Hours As Needed for Vomiting for up to 10 days.               Where to Get Your Medications        These medications were sent to [facility] - [place], [place] - [address] - [phone]  - [phone]   [address], [facility] [address]      Phone: [phone]   ciprofloxacin 500 MG tablet  metroNIDAZOLE 500 MG tablet  ondansetron ODT 4 MG disintegrating tablet            Bernabe Rich PA-C  06/17/25 [id number]

## 2025-06-19 ENCOUNTER — NURSE TRIAGE (OUTPATIENT)
Dept: CALL CENTER | Facility: HOSPITAL | Age: 70
End: 2025-06-19
Payer: MEDICARE

## 2025-06-20 NOTE — TELEPHONE ENCOUNTER
"Reason for Disposition   [1] Caller requesting NON-URGENT health information AND [2] PCP's office is the best resource    Additional Information   Negative: [1] Caller is not with the adult (patient) AND [2] reporting urgent symptoms   Negative: Lab result questions   Negative: Medication questions   Negative: Caller can't be reached by phone   Negative: Caller has already spoken to PCP or another triager   Negative: RN needs further essential information from caller in order to complete triage   Negative: Requesting regular office appointment    Answer Assessment - Initial Assessment Questions  1. REASON FOR CALL or QUESTION: \"What is your reason for calling today?\" or \"How can I best help you?\" or \"What question do you have that I can help answer?\"      Patient calling wanting to know if he can start eating eggs. Was recently seen in ER, diagnosed with diverticulitis. Reviewed AVS, EPIC, no specific timeframe for staying on liquid diet. Advised Patient  ton continue on liquid diet until he follows up with PCP. Patient states he has not seen his PCP in years and not sure if he wants to go back to him. Informed Patient can call back during regular business hours to reach [facility] if he needs referral to new PCP. Instructed to call [facility] back with any further questions or concerns.    Protocols used: Information Only Call - No Triage-ADULT-    "

## 2025-06-27 ENCOUNTER — OFFICE VISIT (OUTPATIENT)
Dept: INTERNAL MEDICINE | Facility: CLINIC | Age: 70
End: 2025-06-27
Payer: MEDICARE

## 2025-06-27 ENCOUNTER — TELEPHONE (OUTPATIENT)
Dept: INTERNAL MEDICINE | Facility: CLINIC | Age: 70
End: 2025-06-27

## 2025-06-27 VITALS
BODY MASS INDEX: 24.86 KG/M2 | DIASTOLIC BLOOD PRESSURE: 85 MMHG | OXYGEN SATURATION: 98 % | WEIGHT: 164 LBS | RESPIRATION RATE: 16 BRPM | HEART RATE: 81 BPM | SYSTOLIC BLOOD PRESSURE: 133 MMHG | HEIGHT: 68 IN | TEMPERATURE: 98.4 F

## 2025-06-27 DIAGNOSIS — Z12.2 SCREENING FOR LUNG CANCER: Primary | ICD-10-CM

## 2025-06-27 DIAGNOSIS — N28.1 RENAL CYST: ICD-10-CM

## 2025-06-27 DIAGNOSIS — Z87.891 PERSONAL HISTORY OF NICOTINE DEPENDENCE: ICD-10-CM

## 2025-06-27 DIAGNOSIS — K57.92 DIVERTICULITIS: ICD-10-CM

## 2025-06-27 DIAGNOSIS — Z12.5 SCREENING FOR PROSTATE CANCER: ICD-10-CM

## 2025-06-27 DIAGNOSIS — I10 PRIMARY HYPERTENSION: ICD-10-CM

## 2025-06-27 NOTE — PROGRESS NOTES
Subjective     Chief Complaint   Patient presents with    Establish Care       History of Present Illness  History of Present Illness  The patient presents for evaluation of diverticulitis, hepatic steatosis, and diffuse urinary bladder wall thickening.    He sought emergency care June 17th, due to severe abdominal pain, described as feeling like a sword was lodged in his stomach. This occurred at 1:00 AM, but he refrained from seeking immediate medical attention. The following morning, he noticed the presence of bloody mucus, prompting his visit to the ER. He was diagnosed with diverticulitis, a condition he had not previously been aware of. He reports a sudden onset of symptoms including fatigue, leg pain, and persistent dry skin. He was advised to adhere to a liquid diet and prescribed antibiotics, which he is scheduled to complete tomorrow. However, he reports missing a few doses due to anxiety-induced insomnia. He also expresses concern about potential medication errors, suspecting he may have taken two doses of lisinopril instead of one. He has never undergone a colonoscopy.    He has a history of alcohol consumption but has been abstinent for over 20 years. He does not have a primary care provider and has not worked for the past six months. He is currently on lisinopril for blood pressure management and does not require a refill at this time. He does not take any medication for heartburn or indigestion, as these symptoms are recent developments. He has discontinued the use of pepper and spices due to their exacerbation of his symptoms.    He has a history of smoking since the age of 10, although he has recently reduced his intake to one pack every three to four days. He reports no known history of diabetes.    SOCIAL HISTORY  The patient has quit drinking alcohol for over 20 years. The patient smokes about a pack every three to four days. The patient has been with his partner for 30 years.      Past  Outpatient Medications Marked as Taking for the 1/24/22 encounter (Refill) with Alex Walden MD   Medication Sig Dispense Refill   • aspirin (ECOTRIN) 81 MG EC tablet Take 1 tablet by mouth daily (with dinner). 30 tablet 2   • montelukast (SINGULAIR) 10 MG tablet Take 1 tablet by mouth nightly. 30 tablet 2   • cetirizine (ZyrTEC) 10 MG tablet Take 1 tablet by mouth daily. 30 tablet 2   • Myrbetriq 50 MG 24 hr tablet Take 1 tablet by mouth daily (at noon). (Patient taking differently: Take 50 mg by mouth daily. Takes at 4 pm, NOT noon.) 30 tablet 2   • potassium CHLORIDE (KLOR-CON M) 20 MEQ jorge ER tablet Take 1 tablet by mouth daily (with dinner). 30 tablet 2   • venlafaxine XR (EFFEXOR XR) 150 MG 24 hr capsule Take 2 capsules by mouth daily. 60 capsule 2   • docusate sodium-sennosides (Senexon-S) 50-8.6 MG per tablet Take 2 tablets by mouth nightly. 60 tablet 2   • calcium carbonate-vitamin D (CALTRATE+D) 600-400 MG-UNIT per tablet Take 1 tablet by mouth daily. In the evening 30 tablet 2   • atorvastatin (LIPITOR) 40 MG tablet Take 1 tablet by mouth nightly. 30 tablet 2   • gabapentin (NEURONTIN) 300 MG capsule Take 1 capsule by mouth nightly. 30 capsule 2   • levothyroxine 75 MCG tablet Take 1 tablet by mouth daily (with dinner). 30 tablet 2   • Cholecalciferol (Vitamin D3) 50 mcg (2,000 units) capsule Take 1 capsule by mouth daily. 31 capsule 5          Pharmacy: [facility] - [address]    "Medical History:   Past Medical History:   Diagnosis Date    Diverticulitis     HL (hearing loss) 2023    Hypertension     Hypertension      Past Surgical History:  Past Surgical History:   Procedure Laterality Date    HERNIA REPAIR       Social History:  reports that he has been smoking cigarettes. He started smoking about 53 years ago. He has a 53.5 pack-year smoking history. He has never used smokeless tobacco. He reports that he does not currently use alcohol. He reports current drug use. Drug: Marijuana.    Family History: family history includes Aneurysm in his father; No Known Problems in his mother.      Allergies:  Allergies   Allergen Reactions    Clotrimazole Swelling and Other (See Comments)     Tongue turned green and fury     Medications:  Prior to Admission medications    Medication Sig Start Date End Date Taking? Authorizing Provider   ciprofloxacin (CIPRO) 500 MG tablet Take 1 tablet by mouth 2 (Two) Times a Day for 10 days. 6/17/25 6/27/25 Yes Bernabe Rich PA-C   lisinopril (PRINIVIL,ZESTRIL) 5 MG tablet Take 1 tablet by mouth Daily. for blood pressure 7/6/24  Yes Provider, MD Renetta   metroNIDAZOLE (FLAGYL) 500 MG tablet Take 1 tablet by mouth 3 (Three) Times a Day for 10 days. 6/17/25 6/27/25 Yes Bernabe Rich PA-C   ondansetron ODT (ZOFRAN-ODT) 4 MG disintegrating tablet Take 1 tablet by mouth Every 8 (Eight) Hours As Needed for Vomiting for up to 10 days.  Patient not taking: Reported on 6/27/2025 6/17/25 6/27/25  Bernabe Rich PA-C   tobramycin 0.3 % solution ophthalmic solution Administer 1 drop to both eyes Every 4 (Four) Hours. 8/15/24 6/27/25  Cara Marin APRN           Review of systems   negative unless otherwise specified above in HPI    Objective     Vital Signs: /85 (BP Location: Left arm, Patient Position: Sitting, Cuff Size: Other (Comment))   Pulse 81   Temp 98.4 °F (36.9 °C) (Temporal)   Resp 16   Ht 172.7 cm (67.99\")   Wt 74.4 kg (164 lb)   SpO2 " 98%   BMI 24.94 kg/m²     Physical Exam  Physical Exam      BMI is within normal parameters. No other follow-up for BMI required.        Results Reviewed:  Glucose   Date Value Ref Range Status   06/17/2025 100 (H) 65 - 99 mg/dL Final   12/19/2018 118 (H) 74 - 109 mg/dL Final     BUN   Date Value Ref Range Status   06/17/2025 18.7 8.0 - 23.0 mg/dL Final   12/19/2018 16 8 - 23 mg/dL Final     Creatinine   Date Value Ref Range Status   06/17/2025 0.89 0.76 - 1.27 mg/dL Final   12/19/2018 0.9 0.5 - 1.2 mg/dL Final     Sodium   Date Value Ref Range Status   06/17/2025 136 136 - 145 mmol/L Final   12/19/2018 140 136 - 145 mmol/L Final     Potassium   Date Value Ref Range Status   06/17/2025 4.3 3.5 - 5.2 mmol/L Final   12/19/2018 4.3 3.5 - 5.0 mmol/L Final     Chloride   Date Value Ref Range Status   06/17/2025 99 98 - 107 mmol/L Final   12/19/2018 103 98 - 111 mmol/L Final     CO2   Date Value Ref Range Status   06/17/2025 24.0 22.0 - 29.0 mmol/L Final   12/19/2018 27 22 - 29 mmol/L Final     Calcium   Date Value Ref Range Status   06/17/2025 9.6 8.6 - 10.5 mg/dL Final   12/19/2018 9.4 8.8 - 10.2 mg/dL Final     ALT (SGPT)   Date Value Ref Range Status   06/17/2025 16 1 - 41 U/L Final   12/19/2018 16 5 - 41 U/L Final     AST (SGOT)   Date Value Ref Range Status   06/17/2025 19 1 - 40 U/L Final   12/19/2018 21 5 - 40 U/L Final     WBC   Date Value Ref Range Status   06/17/2025 10.58 3.40 - 10.80 10*3/mm3 Final   12/19/2018 7.8 4.8 - 10.8 K/uL Final     Hematocrit   Date Value Ref Range Status   06/17/2025 44.0 37.5 - 51.0 % Final   12/19/2018 41.3 (L) 42.0 - 52.0 % Final     Platelets   Date Value Ref Range Status   06/17/2025 218 140 - 450 10*3/mm3 Final   12/19/2018 210 130 - 400 K/uL Final             Results  Labs   - Blood count: Normal   - Chemistry panel: Normal   - Blood sugar: Slightly high   - Test for pancreatitis: Normal   - Urine test: Normal    Imaging   - CT scan of the abdomen: Hepatic steatosis, 5 cm  long segment of the sigmoid wall, left lower pole renal cyst, diffuse urinary bladder wall thickening        Assessment / Plan     Assessment/Plan:   Diagnosis Plan   1. Screening for lung cancer  CT Chest Low Dose Wo      2. Diverticulitis  Ambulatory Referral to Gastroenterology    CBC & Differential      3. Renal cyst  Ambulatory Referral to Urology      4. Personal history of nicotine dependence  CT Chest Low Dose Wo    Hepatitis C Antibody      5. Primary hypertension  Comprehensive Metabolic Panel    Lipid Panel    Urinalysis With Culture If Indicated -      6. Screening for prostate cancer  PSA Screen          Assessment & Plan  1. Diverticulitis.  - Diagnosed with diverticulitis 10 days ago after experiencing severe abdominal pain and noticing bloody mucus.  - Prescribed antibiotics and a liquid diet; should finish antibiotics by Saturday despite missing a few doses.  - ER records show normal blood count, chemistry panel, and urine test; blood sugar slightly elevated.  - Referral to gastroenterologist for further evaluation and potential colonoscopy once inflammation subsides.    2. Hepatic steatosis.  - CT scan revealed hepatic steatosis, possibly related to diet or past alcohol consumption; patient has not consumed alcohol in over 20 years.  - Lipid panel will be ordered to assess cholesterol and triglyceride levels, which may help manage fatty liver.  - Discussion on the importance of monitoring liver health and potential dietary changes.  - Referral to gastroenterologist for further management.    3. Diffuse urinary bladder wall thickening.  - CT scan showed diffuse urinary bladder wall thickening, possibly related to the colon's position.  - Referral to urologist for further evaluation.  - Discussion on the significance of bladder wall thickening and potential implications.  - Urologist will also evaluate the left lower pole renal cyst.    4. Left lower pole renal cyst.  - Noted on CT scan; fluid-filled  cyst, common and usually of no concern.  - Referral to urologist during the evaluation for bladder wall thickening.  - Discussion on the benign nature of renal cysts and monitoring.  - Urologist will provide further assessment and management.    5. Smoking.  - Patient has been smoking since age 10 and currently smokes about a pack every three to four days.  - Low-dose chest CT scan will be ordered for lung cancer screening due to long smoking history.  - Discussion on the importance of lung cancer screening and potential benefits.  - Encouraged to consider smoking cessation.    6. Health Maintenance.  - Baseline labs will be ordered, including a chemistry panel, blood count, and prostate cancer screening via blood test.  - Advised to fast before lab work to ensure accurate cholesterol readings.  - Discussion on the importance of regular health maintenance and screenings.  - Follow-up in 1 month to review lab results and ensure all referrals are completed.      Return in about 4 weeks (around 7/25/2025). unless patient needs to be seen sooner or acute issues arise.      I have discussed the patient results/orders and and plan/recommendation with them at today's visit.      Signed by:    Dr. Bud Antunez Date: 06/27/25    EMR Dictation/Transcription disclaimer:   Some of this note may be an electronic transcription/translation of spoken language to printed text. The electronic translation of spoken language may permit erroneous, or at times, nonsensical words or phrases to be inadvertently transcribed; Although I have reviewed the note for such errors, some may still exist.      Patient or patient representative verbalized consent for the use of Ambient Listening during the visit with  Bud Antunez MD for chart documentation. 6/27/2025  12:00 CDT

## 2025-06-30 ENCOUNTER — TELEPHONE (OUTPATIENT)
Dept: INTERNAL MEDICINE | Facility: CLINIC | Age: 70
End: 2025-06-30
Payer: MEDICARE

## 2025-06-30 ENCOUNTER — LAB (OUTPATIENT)
Dept: LAB | Facility: HOSPITAL | Age: 70
End: 2025-06-30
Payer: MEDICARE

## 2025-06-30 DIAGNOSIS — K57.92 DIVERTICULITIS: ICD-10-CM

## 2025-06-30 DIAGNOSIS — Z12.5 SCREENING FOR PROSTATE CANCER: ICD-10-CM

## 2025-06-30 DIAGNOSIS — I10 PRIMARY HYPERTENSION: ICD-10-CM

## 2025-06-30 DIAGNOSIS — Z87.891 PERSONAL HISTORY OF NICOTINE DEPENDENCE: ICD-10-CM

## 2025-06-30 LAB
ALBUMIN SERPL-MCNC: 4.1 G/DL (ref 3.5–5.2)
ALBUMIN/GLOB SERPL: 1.4 G/DL
ALP SERPL-CCNC: 49 U/L (ref 39–117)
ALT SERPL W P-5'-P-CCNC: 22 U/L (ref 1–41)
ANION GAP SERPL CALCULATED.3IONS-SCNC: 11 MMOL/L (ref 5–15)
AST SERPL-CCNC: 25 U/L (ref 1–40)
BASOPHILS # BLD AUTO: 0.05 10*3/MM3 (ref 0–0.2)
BASOPHILS NFR BLD AUTO: 0.9 % (ref 0–1.5)
BILIRUB SERPL-MCNC: 0.4 MG/DL (ref 0–1.2)
BILIRUB UR QL STRIP: NEGATIVE
BUN SERPL-MCNC: 11.7 MG/DL (ref 8–23)
BUN/CREAT SERPL: 14.1 (ref 7–25)
CALCIUM SPEC-SCNC: 9.6 MG/DL (ref 8.6–10.5)
CHLORIDE SERPL-SCNC: 105 MMOL/L (ref 98–107)
CHOLEST SERPL-MCNC: 89 MG/DL (ref 0–200)
CLARITY UR: CLEAR
CO2 SERPL-SCNC: 25 MMOL/L (ref 22–29)
COLOR UR: YELLOW
CREAT SERPL-MCNC: 0.83 MG/DL (ref 0.76–1.27)
DEPRECATED RDW RBC AUTO: 39.8 FL (ref 37–54)
EGFRCR SERPLBLD CKD-EPI 2021: 94.7 ML/MIN/1.73
EOSINOPHIL # BLD AUTO: 0.23 10*3/MM3 (ref 0–0.4)
EOSINOPHIL NFR BLD AUTO: 4.3 % (ref 0.3–6.2)
ERYTHROCYTE [DISTWIDTH] IN BLOOD BY AUTOMATED COUNT: 12.7 % (ref 12.3–15.4)
GLOBULIN UR ELPH-MCNC: 3 GM/DL
GLUCOSE SERPL-MCNC: 97 MG/DL (ref 65–99)
GLUCOSE UR STRIP-MCNC: NEGATIVE MG/DL
HCT VFR BLD AUTO: 43.2 % (ref 37.5–51)
HCV AB SER QL: NORMAL
HDLC SERPL-MCNC: 34 MG/DL (ref 40–60)
HGB BLD-MCNC: 14.2 G/DL (ref 13–17.7)
HGB UR QL STRIP.AUTO: NEGATIVE
IMM GRANULOCYTES # BLD AUTO: 0.01 10*3/MM3 (ref 0–0.05)
IMM GRANULOCYTES NFR BLD AUTO: 0.2 % (ref 0–0.5)
KETONES UR QL STRIP: NEGATIVE
LDLC SERPL CALC-MCNC: 38 MG/DL (ref 0–100)
LDLC/HDLC SERPL: 1.13 {RATIO}
LEUKOCYTE ESTERASE UR QL STRIP.AUTO: NEGATIVE
LYMPHOCYTES # BLD AUTO: 1.64 10*3/MM3 (ref 0.7–3.1)
LYMPHOCYTES NFR BLD AUTO: 31 % (ref 19.6–45.3)
MCH RBC QN AUTO: 28.5 PG (ref 26.6–33)
MCHC RBC AUTO-ENTMCNC: 32.9 G/DL (ref 31.5–35.7)
MCV RBC AUTO: 86.6 FL (ref 79–97)
MONOCYTES # BLD AUTO: 0.49 10*3/MM3 (ref 0.1–0.9)
MONOCYTES NFR BLD AUTO: 9.3 % (ref 5–12)
NEUTROPHILS NFR BLD AUTO: 2.87 10*3/MM3 (ref 1.7–7)
NEUTROPHILS NFR BLD AUTO: 54.3 % (ref 42.7–76)
NITRITE UR QL STRIP: NEGATIVE
NRBC BLD AUTO-RTO: 0 /100 WBC (ref 0–0.2)
PH UR STRIP.AUTO: 6 [PH] (ref 5–8)
PLATELET # BLD AUTO: 263 10*3/MM3 (ref 140–450)
PMV BLD AUTO: 10.7 FL (ref 6–12)
POTASSIUM SERPL-SCNC: 4.7 MMOL/L (ref 3.5–5.2)
PROT SERPL-MCNC: 7.1 G/DL (ref 6–8.5)
PROT UR QL STRIP: NEGATIVE
PSA SERPL-MCNC: 2.08 NG/ML (ref 0–4)
RBC # BLD AUTO: 4.99 10*6/MM3 (ref 4.14–5.8)
SODIUM SERPL-SCNC: 141 MMOL/L (ref 136–145)
SP GR UR STRIP: 1.01 (ref 1–1.03)
TRIGL SERPL-MCNC: 83 MG/DL (ref 0–150)
UROBILINOGEN UR QL STRIP: NORMAL
VLDLC SERPL-MCNC: 17 MG/DL (ref 5–40)
WBC NRBC COR # BLD AUTO: 5.29 10*3/MM3 (ref 3.4–10.8)

## 2025-06-30 PROCEDURE — G0103 PSA SCREENING: HCPCS

## 2025-06-30 PROCEDURE — 80053 COMPREHEN METABOLIC PANEL: CPT

## 2025-06-30 PROCEDURE — 81003 URINALYSIS AUTO W/O SCOPE: CPT

## 2025-06-30 PROCEDURE — 85025 COMPLETE CBC W/AUTO DIFF WBC: CPT

## 2025-06-30 PROCEDURE — 86803 HEPATITIS C AB TEST: CPT

## 2025-06-30 PROCEDURE — 36415 COLL VENOUS BLD VENIPUNCTURE: CPT

## 2025-06-30 PROCEDURE — 80061 LIPID PANEL: CPT

## 2025-06-30 NOTE — TELEPHONE ENCOUNTER
PATIENT HAS BEEN CALLED, GIVEN MESSAGE REGARDING LAB RESULTS.  IT WAS EXPLAINED TO PATIENT REGARDING REFERRALS TO UROLOGY AND GASTROENTEROLOGY.

## 2025-07-01 NOTE — TELEPHONE ENCOUNTER
Caller: Domingo Evans    Relationship: Self    Best call back number: [phone]     What is the best time to reach you: ANY    Who are you requesting to speak with (clinical staff, provider,  specific staff member): NONE SPECIFIED     What was the call regarding:     PATIENT IS SCHEDULED FOR A CT SCAN ON 07.30.25. PATIENT IS WONDERING IF HE NEEDS TO RESCHEDULE HIS 07.25.25 APPOINTMENT TO BE AFTER HIS CT SCAN.     Is it okay if the provider responds through [employer]: YES    
I tried calling patient, no answer.  I left a message for him to call the office.   
Patient informed, per Dr. Antunez, it is not necessary for him to change his appt with him because his chest CT is after his appt.   
no

## 2025-07-21 NOTE — PROGRESS NOTES
"Chief Complaint  Left renal cyst    Subjective          Domingo Evans presents to [facility]   Patient is here today due to recent CT of the abdomen that showed a left renal cyst and some bladder wall thickening.  This was obtained because he was having some significant abdominal discomfort from what eventually was diagnosed as diverticulitis sigmoid colon.  See symptom score below under data  for specific symptomatology, severity as well as bother with regard to his lower urinary tract symptoms.   The patient denies any hematuria, flank pain patient denies gross hematuria, flank pain, abdominal pain, night sweats, unexplained weight loss, unusual musculoskeletal pain, hemoptysis, or unexplained shortness of breath.                Current Outpatient Medications:     lisinopril (PRINIVIL,ZESTRIL) 5 MG tablet, Take 1 tablet by mouth Daily. for blood pressure, Disp: , Rfl:     fluticasone (FLONASE) 50 MCG/ACT nasal spray, Administer 2 sprays into the nostril(s) as directed by provider Daily. (Patient not taking: Reported on 7/31/2025), Disp: 16 g, Rfl: 5  Past Medical History:   Diagnosis Date    Diverticulitis     HL (hearing loss) 2023    Hypertension     Hypertension      Past Surgical History:   Procedure Laterality Date    HERNIA REPAIR             Review of Systems      Objective   PHYSICAL EXAM  Vital Signs:   Temp 97 °F (36.1 °C)   Ht 172.7 cm (68\")   Wt 76.1 kg (167 lb 12.8 oz)   BMI 25.51 kg/m²     Physical Exam      DATA  Result Review :              Results for orders placed or performed in visit on 07/31/25   POC Urinalysis Dipstick, Multipro    Collection Time: 07/31/25 10:31 AM    Specimen: Urine   Result Value Ref Range    Color Yellow Yellow, Straw, Dark Yellow, Ginger    Clarity, UA Clear Clear    Glucose, UA Negative Negative mg/dL    Bilirubin Negative Negative    Ketones, UA Negative Negative    Specific Gravity  1.010 1.005 - 1.030    Blood, UA Trace (A) Negative    " pH, Urine 7.0 5.0 - 8.0    Protein, POC Negative Negative mg/dL    Urobilinogen, UA 0.2 E.U./dL Normal, 0.2 E.U./dL    Nitrite, UA Negative Negative    Leukocytes Negative Negative      CT Abdomen Pelvis With Contrast (06/17/2025 15:02)   EXAM/TECHNIQUE: CT abdomen and pelvis with IV contrast     INDICATION: Lower abdominal pain     COMPARISON: None available.     DLP: 302.42 mGy.cm. Automated exposure control was utilized to decrease  patient radiation dose.     FINDINGS:     LOWER CHEST: No acute findings.     LIVER AND BILIARY: Hepatic steatosis. No suspicious liver lesion. No  gallstones or biliary ductal dilatation.      PANCREAS: Unremarkable.      SPLEEN: Unremarkable.      ADRENAL: Unremarkable.     KIDNEYS/BLADDER: Left lower pole renal cyst. No solid enhancing renal  mass. No urolithiasis or hydronephrosis. Diffuse urinary bladder wall  thickening.     BOWEL: 5 cm long segment of sigmoid colon wall thickening with a focally  inflamed sigmoid diverticula. Surrounding fat stranding is noted. No  free air or abscess. Normal appendix. No small bowel distention or  inflammatory change.      PERITONEUM: No ascites.      PELVIC ORGANS: Prostate is enlarged measuring 4.7 cm transverse  dimension.     VASCULATURE: Nonaneurysmal abdominal aorta with scattered  atherosclerotic calcification.     LYMPH NODES: No enlarged lymph nodes.      SOFT TISSUES: Small right inguinal fat-containing hernia. Small  periumbilical fat-containing hernia.     BONES: Mild multilevel lumbar spine degenerative change.     IMPRESSION:     1.  Acute uncomplicated sigmoid diverticulitis. No evidence of free air  or abscess.     2.  Hepatic steatosis.     3.  Diffuse urinary bladder wall thickening. This may be secondary to  adjacent sigmoid inflammation but acute cystitis and sequela of chronic  partial outlet obstruction is also in the differential. Prostate is  mildly enlarged.           This report was signed and finalized on 6/17/2025  3:19 PM by Dr. Kevin Medellin MD.      Reviewed report and the images.  Patient has a Bosniak 1 simple renal cyst with no characteristics for concern.  This does not need follow-up.  The bladder wall thickening is symmetric and likely related to a decompressed bladder versus some bladder outlet obstruction./DLS             ASSESSMENT AND PLAN          Problem List Items Addressed This Visit    None  Visit Diagnoses         Renal cyst, left    -  Primary    Relevant Orders    POC Urinalysis Dipstick, Multipro (Completed)      Bladder wall thickening              He has a Bosniak 1 simple renal cyst that would need no further follow-up.  There is no predisposition to malignancy.  I did tell him he needs to contact me if he started to see gross hematuria.  It probably would not be from the cyst but further evaluation of urinary tract would be indicated.  Patient bladder wall thickening I believe is because the bladder is decompressed.  He may have some element of bladder outlet obstruction with thickening but it would seem to be mild.  His symptoms are not concerning.  I do not think the bladder wall thickening is indicative of a significant urologic issue.        FOLLOW UP     Return if symptoms worsen or fail to improve.        (Please note that portions of this note were completed with a voice recognition program.)  Damian Lam MD  07/31/25  12:43 CDT

## 2025-07-25 ENCOUNTER — OFFICE VISIT (OUTPATIENT)
Dept: INTERNAL MEDICINE | Facility: CLINIC | Age: 70
End: 2025-07-25
Payer: MEDICARE

## 2025-07-25 ENCOUNTER — TELEPHONE (OUTPATIENT)
Dept: INTERNAL MEDICINE | Facility: CLINIC | Age: 70
End: 2025-07-25

## 2025-07-25 VITALS
RESPIRATION RATE: 16 BRPM | OXYGEN SATURATION: 98 % | HEART RATE: 66 BPM | BODY MASS INDEX: 25.01 KG/M2 | TEMPERATURE: 97.3 F | DIASTOLIC BLOOD PRESSURE: 88 MMHG | HEIGHT: 68 IN | WEIGHT: 165 LBS | SYSTOLIC BLOOD PRESSURE: 154 MMHG

## 2025-07-25 DIAGNOSIS — H93.13 TINNITUS OF BOTH EARS: ICD-10-CM

## 2025-07-25 DIAGNOSIS — Z12.11 COLON CANCER SCREENING: Primary | ICD-10-CM

## 2025-07-25 DIAGNOSIS — H69.93 DYSFUNCTION OF BOTH EUSTACHIAN TUBES: ICD-10-CM

## 2025-07-25 DIAGNOSIS — J30.9 ALLERGIC RHINITIS, UNSPECIFIED SEASONALITY, UNSPECIFIED TRIGGER: ICD-10-CM

## 2025-07-25 DIAGNOSIS — J32.9 CHRONIC SINUSITIS, UNSPECIFIED LOCATION: ICD-10-CM

## 2025-07-25 DIAGNOSIS — H65.33 CHRONIC MUCOID OTITIS MEDIA OF BOTH EARS: ICD-10-CM

## 2025-07-25 RX ORDER — FLUTICASONE PROPIONATE 50 MCG
2 SPRAY, SUSPENSION (ML) NASAL DAILY
Qty: 16 G | Refills: 5 | Status: SHIPPED | OUTPATIENT
Start: 2025-07-25

## 2025-07-25 NOTE — PROGRESS NOTES
Subjective     Chief Complaint   Patient presents with    Ear Problem    Dizziness    Sinus Problem       Dizziness  Sinus Problem      History of Present Illness  The patient presents for evaluation of postnasal drip, diverticulitis, and blood pressure management.    He reports a sensation of something in the back of his throat, which he observed to be yellowish in color after discontinuing medication for diverticulitis. He suspects this could be related to the medication. The color has since faded, but the sensation persists. He also mentions a feeling of blockage in his ears, which is exacerbated by rapid movement. He has been experiencing postnasal drip, which he attributes to his work environment where he wears a mask. He has not consulted with a gastroenterologist or urologist. He has an upcoming appointment with Dr. Lam on 07/31/2025. He has an appointment with Diane Best from  on 09/03/2025. He has a CT chest scan scheduled for 07/30/2025. He has not been taking any allergy medication due to adverse effects on his mental state. He recalls being prescribed several medications when he first started seeking medical care, but he discontinued them due to suicidal thoughts.    He experienced stomach discomfort after consuming a vanilla shake from [employer], which he believes was due to the dairy content. This is his first episode of diverticulitis. He reports feeling stronger and more energetic, with less leg pain. He has been working on a 10-foot ceiling, climbing ladders, and doing outdoor work in the morning and indoor work at night. He recently had a dental implant placed.    His blood pressure was slightly elevated during this visit, which is typical for him. He is currently taking lisinopril for blood pressure management.    PAST SURGICAL HISTORY:  Dental implant placed approximately 3 months ago.      Past Medical History:   Past Medical History:   Diagnosis Date    Diverticulitis     HL  "(hearing loss) 2023    Hypertension     Hypertension      Past Surgical History:  Past Surgical History:   Procedure Laterality Date    HERNIA REPAIR       Social History:  reports that he has been smoking cigarettes. He started smoking about 53 years ago. He has a 53.6 pack-year smoking history. He has never used smokeless tobacco. He reports that he does not currently use alcohol. He reports current drug use. Drug: Marijuana.    Family History: family history includes Aneurysm in his father; No Known Problems in his mother.      Allergies:  Allergies   Allergen Reactions    Clotrimazole Swelling and Other (See Comments)     Tongue turned green and fury     Medications:  Prior to Admission medications    Medication Sig Start Date End Date Taking? Authorizing Provider   lisinopril (PRINIVIL,ZESTRIL) 5 MG tablet Take 1 tablet by mouth Daily. for blood pressure 7/6/24  Yes Provider, Historical, MD           Review of systems   negative unless otherwise specified above in HPI    Objective     Vital Signs: /88 (BP Location: Left arm, Patient Position: Sitting, Cuff Size: Other (Comment))   Pulse 66   Temp 97.3 °F (36.3 °C) (Temporal)   Resp 16   Ht 172.7 cm (67.99\")   Wt 74.8 kg (165 lb)   SpO2 98%   BMI 25.10 kg/m²     Physical Exam  Vitals reviewed.   Constitutional:       General: He is not in acute distress.     Appearance: Normal appearance. He is not ill-appearing.   HENT:      Head: Normocephalic and atraumatic.      Right Ear: Tympanic membrane normal.      Left Ear: Tympanic membrane normal.      Nose: Congestion present. No rhinorrhea.      Mouth/Throat:      Mouth: Mucous membranes are moist.      Pharynx: Oropharynx is clear. No oropharyngeal exudate or posterior oropharyngeal erythema.      Comments: There is a scant post nasal drip noted on the pharynx  Eyes:      Extraocular Movements: Extraocular movements intact.      Conjunctiva/sclera: Conjunctivae normal.      Pupils: Pupils are equal, " round, and reactive to light.   Neck:      Vascular: No carotid bruit.   Cardiovascular:      Rate and Rhythm: Normal rate and regular rhythm.      Heart sounds: Normal heart sounds.   Pulmonary:      Effort: Pulmonary effort is normal.      Breath sounds: Normal breath sounds.   Abdominal:      General: Bowel sounds are normal.      Palpations: Abdomen is soft.   Musculoskeletal:         General: Normal range of motion.      Cervical back: Normal range of motion.   Skin:     General: Skin is warm and dry.      Capillary Refill: Capillary refill takes less than 2 seconds.   Neurological:      General: No focal deficit present.      Mental Status: He is alert.   Psychiatric:         Mood and Affect: Mood normal.       Physical Exam  Ears: Excessive cerumen in the right ear.  Mouth/Throat: Yellowish discoloration noted in the posterior pharynx.    BMI is >= 25 and <30. (Overweight) The following options were offered after discussion;: exercise counseling/recommendations and nutrition counseling/recommendations        Results Reviewed:  Glucose   Date Value Ref Range Status   06/30/2025 97 65 - 99 mg/dL Final   12/19/2018 118 (H) 74 - 109 mg/dL Final     BUN   Date Value Ref Range Status   06/30/2025 11.7 8.0 - 23.0 mg/dL Final   12/19/2018 16 8 - 23 mg/dL Final     Creatinine   Date Value Ref Range Status   06/30/2025 0.83 0.76 - 1.27 mg/dL Final   12/19/2018 0.9 0.5 - 1.2 mg/dL Final     Sodium   Date Value Ref Range Status   06/30/2025 141 136 - 145 mmol/L Final   12/19/2018 140 136 - 145 mmol/L Final     Potassium   Date Value Ref Range Status   06/30/2025 4.7 3.5 - 5.2 mmol/L Final   12/19/2018 4.3 3.5 - 5.0 mmol/L Final     Chloride   Date Value Ref Range Status   06/30/2025 105 98 - 107 mmol/L Final   12/19/2018 103 98 - 111 mmol/L Final     CO2   Date Value Ref Range Status   06/30/2025 25.0 22.0 - 29.0 mmol/L Final   12/19/2018 27 22 - 29 mmol/L Final     Calcium   Date Value Ref Range Status   06/30/2025 9.6  8.6 - 10.5 mg/dL Final   12/19/2018 9.4 8.8 - 10.2 mg/dL Final     ALT (SGPT)   Date Value Ref Range Status   06/30/2025 22 1 - 41 U/L Final   12/19/2018 16 5 - 41 U/L Final     AST (SGOT)   Date Value Ref Range Status   06/30/2025 25 1 - 40 U/L Final   12/19/2018 21 5 - 40 U/L Final     WBC   Date Value Ref Range Status   06/30/2025 5.29 3.40 - 10.80 10*3/mm3 Final   12/19/2018 7.8 4.8 - 10.8 K/uL Final     Hematocrit   Date Value Ref Range Status   06/30/2025 43.2 37.5 - 51.0 % Final   12/19/2018 41.3 (L) 42.0 - 52.0 % Final     Platelets   Date Value Ref Range Status   06/30/2025 263 140 - 450 10*3/mm3 Final   12/19/2018 210 130 - 400 K/uL Final     Total Cholesterol   Date Value Ref Range Status   06/30/2025 89 0 - 200 mg/dL Final     Triglycerides   Date Value Ref Range Status   06/30/2025 83 0 - 150 mg/dL Final     HDL Cholesterol   Date Value Ref Range Status   06/30/2025 34 (L) 40 - 60 mg/dL Final     LDL Cholesterol    Date Value Ref Range Status   06/30/2025 38 0 - 100 mg/dL Final     LDL/HDL Ratio   Date Value Ref Range Status   06/30/2025 1.13  Final     The following data was reviewed by: Bud Antunez MD on 07/25/2025:        Results  Labs   - Cholesterol levels: Excellent   - Blood count: Normal   - Chemistry panel: Normal   - Pancreatic enzyme: Normal        Assessment / Plan     Assessment/Plan:   Diagnosis Plan   1. Colon cancer screening  Cologuard - Stool, Per Rectum      2. Allergic rhinitis, unspecified seasonality, unspecified trigger        3. Chronic mucoid otitis media of both ears        4. Chronic sinusitis, unspecified location        5. Dysfunction of both eustachian tubes        6. Tinnitus of both ears            Assessment & Plan  1. Postnasal drip.  - Reports sensation in the back of throat and yellowish discharge, which has since resolved.  - Physical exam did not reveal any abnormalities; symptoms have improved.  - Discussed the possibility of postnasal drip.  - No current  treatment is necessary as symptoms have improved. Fluticasone nasal sporay will be helpful    2. Diverticulitis.  - Experienced an episode of diverticulitis after discontinuing medication; stomach symptoms have improved.  - Blood work and chemistry panel results were normal.  - Ordered Cologuard test to be sent to home; GI referral cancelled.  - If another episode occurs, a colonoscopy will be necessary.    3. Blood pressure management.  - Blood pressure was slightly elevated during the visit.  - Currently taking lisinopril; effectiveness noted.  - Advised to continue taking lisinopril as prescribed.  - Blood pressure will be monitored at next visit.    Follow-up: The patient will follow up in 3 months.      No follow-ups on file. unless patient needs to be seen sooner or acute issues arise.      I have discussed the patient results/orders and and plan/recommendation with them at today's visit.      Signed by:    Dr. Bud Antunez Date: 07/25/25    EMR Dictation/Transcription disclaimer:   Some of this note may be an electronic transcription/translation of spoken language to printed text. The electronic translation of spoken language may permit erroneous, or at times, nonsensical words or phrases to be inadvertently transcribed; Although I have reviewed the note for such errors, some may still exist.      Patient or patient representative verbalized consent for the use of Ambient Listening during the visit with  Bud Antunez MD for chart documentation. 7/25/2025  13:09 CDT

## 2025-07-25 NOTE — TELEPHONE ENCOUNTER
Patient informed, per Dr. Antunez, a prescription for a daily nose spray was sent to his pharmacy to help his ears and sinuses if he wants to try it.  He stated he will try it and will let us know if it helps.

## 2025-07-30 ENCOUNTER — HOSPITAL ENCOUNTER (OUTPATIENT)
Dept: CT IMAGING | Facility: HOSPITAL | Age: 70
Discharge: HOME | End: 2025-07-30
Admitting: INTERNAL MEDICINE
Payer: MEDICARE

## 2025-07-30 DIAGNOSIS — Z87.891 PERSONAL HISTORY OF NICOTINE DEPENDENCE: ICD-10-CM

## 2025-07-30 DIAGNOSIS — Z12.2 SCREENING FOR LUNG CANCER: ICD-10-CM

## 2025-07-30 PROCEDURE — 71271 CT THORAX LUNG CANCER SCR C-: CPT

## 2025-07-31 ENCOUNTER — OFFICE VISIT (OUTPATIENT)
Dept: UROLOGY | Facility: CLINIC | Age: 70
End: 2025-07-31
Payer: MEDICARE

## 2025-07-31 VITALS — HEIGHT: 68 IN | TEMPERATURE: 97 F | WEIGHT: 167.8 LBS | BODY MASS INDEX: 25.43 KG/M2

## 2025-07-31 DIAGNOSIS — N28.1 RENAL CYST, LEFT: Primary | ICD-10-CM

## 2025-07-31 DIAGNOSIS — N32.89 BLADDER WALL THICKENING: ICD-10-CM

## 2025-07-31 LAB
BILIRUB BLD-MCNC: NEGATIVE MG/DL
CLARITY, POC: CLEAR
COLOR UR: YELLOW
GLUCOSE UR STRIP-MCNC: NEGATIVE MG/DL
KETONES UR QL: NEGATIVE
LEUKOCYTE EST, POC: NEGATIVE
NITRITE UR-MCNC: NEGATIVE MG/ML
PH UR: 7 [PH] (ref 5–8)
PROT UR STRIP-MCNC: NEGATIVE MG/DL
RBC # UR STRIP: ABNORMAL /UL
SP GR UR: 1.01 (ref 1–1.03)
UROBILINOGEN UR QL: ABNORMAL

## 2025-08-12 ENCOUNTER — TELEPHONE (OUTPATIENT)
Dept: INTERNAL MEDICINE | Facility: CLINIC | Age: 70
End: 2025-08-12
Payer: MEDICARE

## 2025-08-12 DIAGNOSIS — R19.5 POSITIVE COLORECTAL CANCER SCREENING USING COLOGUARD TEST: Primary | ICD-10-CM
